# Patient Record
Sex: MALE | Race: WHITE | Employment: FULL TIME | URBAN - METROPOLITAN AREA
[De-identification: names, ages, dates, MRNs, and addresses within clinical notes are randomized per-mention and may not be internally consistent; named-entity substitution may affect disease eponyms.]

---

## 2023-10-11 ENCOUNTER — HOSPITAL ENCOUNTER (INPATIENT)
Facility: HOSPITAL | Age: 53
LOS: 6 days | Discharge: HOME | DRG: 540 | End: 2023-10-17
Attending: EMERGENCY MEDICINE | Admitting: HOSPITALIST
Payer: COMMERCIAL

## 2023-10-11 ENCOUNTER — APPOINTMENT (OUTPATIENT)
Facility: HOSPITAL | Age: 53
DRG: 540 | End: 2023-10-11
Payer: COMMERCIAL

## 2023-10-11 DIAGNOSIS — M86.171 OSTEOMYELITIS OF ANKLE OR FOOT, ACUTE, RIGHT (HCC): ICD-10-CM

## 2023-10-11 DIAGNOSIS — L03.031 CELLULITIS OF TOE OF RIGHT FOOT: Primary | ICD-10-CM

## 2023-10-11 DIAGNOSIS — R60.0 EDEMA OF RIGHT LOWER LEG: ICD-10-CM

## 2023-10-11 DIAGNOSIS — M10.9 URIC ACID ARTHROPATHY: ICD-10-CM

## 2023-10-11 DIAGNOSIS — R00.0 TACHYCARDIA: ICD-10-CM

## 2023-10-11 DIAGNOSIS — F10.10 ETOH ABUSE: ICD-10-CM

## 2023-10-11 PROBLEM — L02.611 CELLULITIS AND ABSCESS OF TOE OF RIGHT FOOT: Status: ACTIVE | Noted: 2023-10-11

## 2023-10-11 LAB
ALBUMIN SERPL-MCNC: 3.4 G/DL (ref 3.5–5)
ALBUMIN/GLOB SERPL: 0.6 (ref 1.1–2.2)
ALP SERPL-CCNC: 99 U/L (ref 45–117)
ALT SERPL-CCNC: 89 U/L (ref 12–78)
ANION GAP SERPL CALC-SCNC: 10 MMOL/L (ref 5–15)
AST SERPL-CCNC: 119 U/L (ref 15–37)
BASOPHILS # BLD: 0.1 K/UL (ref 0–0.1)
BASOPHILS NFR BLD: 1 % (ref 0–1)
BILIRUB SERPL-MCNC: 0.6 MG/DL (ref 0.2–1)
BUN SERPL-MCNC: 14 MG/DL (ref 6–20)
BUN/CREAT SERPL: 12 (ref 12–20)
CALCIUM SERPL-MCNC: 9 MG/DL (ref 8.5–10.1)
CHLORIDE SERPL-SCNC: 104 MMOL/L (ref 97–108)
CO2 SERPL-SCNC: 23 MMOL/L (ref 21–32)
CREAT SERPL-MCNC: 1.13 MG/DL (ref 0.7–1.3)
DIFFERENTIAL METHOD BLD: ABNORMAL
EOSINOPHIL # BLD: 0.3 K/UL (ref 0–0.4)
EOSINOPHIL NFR BLD: 3 % (ref 0–7)
ERYTHROCYTE [DISTWIDTH] IN BLOOD BY AUTOMATED COUNT: 13.3 % (ref 11.5–14.5)
ETHANOL SERPL-MCNC: 85 MG/DL (ref 0–0.08)
GLOBULIN SER CALC-MCNC: 5.9 G/DL (ref 2–4)
GLUCOSE SERPL-MCNC: 182 MG/DL (ref 65–100)
HCT VFR BLD AUTO: 41.7 % (ref 36.6–50.3)
HGB BLD-MCNC: 14.5 G/DL (ref 12.1–17)
IMM GRANULOCYTES # BLD AUTO: 0 K/UL (ref 0–0.04)
IMM GRANULOCYTES NFR BLD AUTO: 1 % (ref 0–0.5)
LACTATE BLD-SCNC: 2.38 MMOL/L (ref 0.4–2)
LACTATE SERPL-SCNC: 2.4 MMOL/L (ref 0.4–2)
LIPASE SERPL-CCNC: 98 U/L (ref 13–75)
LYMPHOCYTES # BLD: 1.4 K/UL (ref 0.8–3.5)
LYMPHOCYTES NFR BLD: 16 % (ref 12–49)
MAGNESIUM SERPL-MCNC: 1.9 MG/DL (ref 1.6–2.4)
MCH RBC QN AUTO: 36.6 PG (ref 26–34)
MCHC RBC AUTO-ENTMCNC: 34.8 G/DL (ref 30–36.5)
MCV RBC AUTO: 105.3 FL (ref 80–99)
MONOCYTES # BLD: 0.8 K/UL (ref 0–1)
MONOCYTES NFR BLD: 9 % (ref 5–13)
NEUTS SEG # BLD: 6.2 K/UL (ref 1.8–8)
NEUTS SEG NFR BLD: 70 % (ref 32–75)
NRBC # BLD: 0 K/UL (ref 0–0.01)
NRBC BLD-RTO: 0 PER 100 WBC
PLATELET # BLD AUTO: 164 K/UL (ref 150–400)
PMV BLD AUTO: 8.5 FL (ref 8.9–12.9)
POTASSIUM SERPL-SCNC: 3.9 MMOL/L (ref 3.5–5.1)
PROT SERPL-MCNC: 9.3 G/DL (ref 6.4–8.2)
RBC # BLD AUTO: 3.96 M/UL (ref 4.1–5.7)
SODIUM SERPL-SCNC: 137 MMOL/L (ref 136–145)
WBC # BLD AUTO: 8.8 K/UL (ref 4.1–11.1)

## 2023-10-11 PROCEDURE — 83735 ASSAY OF MAGNESIUM: CPT

## 2023-10-11 PROCEDURE — 6370000000 HC RX 637 (ALT 250 FOR IP): Performed by: HOSPITALIST

## 2023-10-11 PROCEDURE — 36415 COLL VENOUS BLD VENIPUNCTURE: CPT

## 2023-10-11 PROCEDURE — 73630 X-RAY EXAM OF FOOT: CPT

## 2023-10-11 PROCEDURE — 1100000000 HC RM PRIVATE

## 2023-10-11 PROCEDURE — 2580000003 HC RX 258: Performed by: HOSPITALIST

## 2023-10-11 PROCEDURE — 6360000002 HC RX W HCPCS: Performed by: NURSE PRACTITIONER

## 2023-10-11 PROCEDURE — 6360000002 HC RX W HCPCS: Performed by: HOSPITALIST

## 2023-10-11 PROCEDURE — 83690 ASSAY OF LIPASE: CPT

## 2023-10-11 PROCEDURE — 85025 COMPLETE CBC W/AUTO DIFF WBC: CPT

## 2023-10-11 PROCEDURE — 83605 ASSAY OF LACTIC ACID: CPT

## 2023-10-11 PROCEDURE — 87040 BLOOD CULTURE FOR BACTERIA: CPT

## 2023-10-11 PROCEDURE — 80053 COMPREHEN METABOLIC PANEL: CPT

## 2023-10-11 PROCEDURE — 2580000003 HC RX 258: Performed by: NURSE PRACTITIONER

## 2023-10-11 PROCEDURE — 99285 EMERGENCY DEPT VISIT HI MDM: CPT

## 2023-10-11 PROCEDURE — 82077 ASSAY SPEC XCP UR&BREATH IA: CPT

## 2023-10-11 RX ORDER — LORAZEPAM 2 MG/ML
4 INJECTION INTRAMUSCULAR
Status: DISCONTINUED | OUTPATIENT
Start: 2023-10-11 | End: 2023-10-17 | Stop reason: HOSPADM

## 2023-10-11 RX ORDER — LORAZEPAM 2 MG/ML
2 INJECTION INTRAMUSCULAR
Status: DISCONTINUED | OUTPATIENT
Start: 2023-10-11 | End: 2023-10-17 | Stop reason: HOSPADM

## 2023-10-11 RX ORDER — ONDANSETRON 2 MG/ML
4 INJECTION INTRAMUSCULAR; INTRAVENOUS EVERY 6 HOURS PRN
Status: DISCONTINUED | OUTPATIENT
Start: 2023-10-11 | End: 2023-10-17 | Stop reason: HOSPADM

## 2023-10-11 RX ORDER — SODIUM CHLORIDE 9 MG/ML
INJECTION, SOLUTION INTRAVENOUS PRN
Status: DISCONTINUED | OUTPATIENT
Start: 2023-10-11 | End: 2023-10-17 | Stop reason: HOSPADM

## 2023-10-11 RX ORDER — SODIUM CHLORIDE 0.9 % (FLUSH) 0.9 %
5-40 SYRINGE (ML) INJECTION PRN
Status: DISCONTINUED | OUTPATIENT
Start: 2023-10-11 | End: 2023-10-17 | Stop reason: HOSPADM

## 2023-10-11 RX ORDER — GAUZE BANDAGE 2" X 2"
100 BANDAGE TOPICAL DAILY
Status: DISCONTINUED | OUTPATIENT
Start: 2023-10-11 | End: 2023-10-17 | Stop reason: HOSPADM

## 2023-10-11 RX ORDER — ACETAMINOPHEN 325 MG/1
650 TABLET ORAL EVERY 6 HOURS PRN
Status: DISCONTINUED | OUTPATIENT
Start: 2023-10-11 | End: 2023-10-11 | Stop reason: SDUPTHER

## 2023-10-11 RX ORDER — LORAZEPAM 2 MG/ML
1 INJECTION INTRAMUSCULAR
Status: DISCONTINUED | OUTPATIENT
Start: 2023-10-11 | End: 2023-10-17 | Stop reason: HOSPADM

## 2023-10-11 RX ORDER — SODIUM CHLORIDE 9 MG/ML
INJECTION, SOLUTION INTRAVENOUS PRN
Status: DISCONTINUED | OUTPATIENT
Start: 2023-10-11 | End: 2023-10-11 | Stop reason: SDUPTHER

## 2023-10-11 RX ORDER — ACETAMINOPHEN 650 MG/1
650 SUPPOSITORY RECTAL EVERY 6 HOURS PRN
Status: DISCONTINUED | OUTPATIENT
Start: 2023-10-11 | End: 2023-10-11 | Stop reason: SDUPTHER

## 2023-10-11 RX ORDER — PHENOBARBITAL 32.4 MG/1
16.2 TABLET ORAL EVERY 6 HOURS PRN
Status: DISCONTINUED | OUTPATIENT
Start: 2023-10-13 | End: 2023-10-11

## 2023-10-11 RX ORDER — PHENOBARBITAL 32.4 MG/1
32.4 TABLET ORAL EVERY 6 HOURS PRN
Status: DISCONTINUED | OUTPATIENT
Start: 2023-10-11 | End: 2023-10-11

## 2023-10-11 RX ORDER — PHENOBARBITAL 32.4 MG/1
16.2 TABLET ORAL 2 TIMES DAILY
Status: COMPLETED | OUTPATIENT
Start: 2023-10-13 | End: 2023-10-14

## 2023-10-11 RX ORDER — LORAZEPAM 2 MG/ML
3 INJECTION INTRAMUSCULAR
Status: DISCONTINUED | OUTPATIENT
Start: 2023-10-11 | End: 2023-10-17 | Stop reason: HOSPADM

## 2023-10-11 RX ORDER — SODIUM CHLORIDE 0.9 % (FLUSH) 0.9 %
5-40 SYRINGE (ML) INJECTION EVERY 12 HOURS SCHEDULED
Status: DISCONTINUED | OUTPATIENT
Start: 2023-10-11 | End: 2023-10-11 | Stop reason: SDUPTHER

## 2023-10-11 RX ORDER — SODIUM CHLORIDE 0.9 % (FLUSH) 0.9 %
5-40 SYRINGE (ML) INJECTION EVERY 12 HOURS SCHEDULED
Status: DISCONTINUED | OUTPATIENT
Start: 2023-10-11 | End: 2023-10-17 | Stop reason: HOSPADM

## 2023-10-11 RX ORDER — BUPROPION HYDROCHLORIDE 150 MG/1
150 TABLET ORAL EVERY MORNING
COMMUNITY

## 2023-10-11 RX ORDER — ONDANSETRON 2 MG/ML
4 INJECTION INTRAMUSCULAR; INTRAVENOUS EVERY 6 HOURS PRN
Status: DISCONTINUED | OUTPATIENT
Start: 2023-10-11 | End: 2023-10-11 | Stop reason: SDUPTHER

## 2023-10-11 RX ORDER — POLYETHYLENE GLYCOL 3350 17 G/17G
17 POWDER, FOR SOLUTION ORAL DAILY PRN
Status: DISCONTINUED | OUTPATIENT
Start: 2023-10-11 | End: 2023-10-11 | Stop reason: SDUPTHER

## 2023-10-11 RX ORDER — POLYETHYLENE GLYCOL 3350 17 G/17G
17 POWDER, FOR SOLUTION ORAL DAILY PRN
Status: DISCONTINUED | OUTPATIENT
Start: 2023-10-11 | End: 2023-10-17 | Stop reason: HOSPADM

## 2023-10-11 RX ORDER — PHENOBARBITAL 32.4 MG/1
32.4 TABLET ORAL 4 TIMES DAILY
Status: COMPLETED | OUTPATIENT
Start: 2023-10-11 | End: 2023-10-12

## 2023-10-11 RX ORDER — LORAZEPAM 1 MG/1
4 TABLET ORAL
Status: DISCONTINUED | OUTPATIENT
Start: 2023-10-11 | End: 2023-10-17 | Stop reason: HOSPADM

## 2023-10-11 RX ORDER — ACETAMINOPHEN 650 MG/1
650 SUPPOSITORY RECTAL EVERY 6 HOURS PRN
Status: DISCONTINUED | OUTPATIENT
Start: 2023-10-11 | End: 2023-10-17 | Stop reason: HOSPADM

## 2023-10-11 RX ORDER — ONDANSETRON 4 MG/1
4 TABLET, ORALLY DISINTEGRATING ORAL EVERY 8 HOURS PRN
Status: DISCONTINUED | OUTPATIENT
Start: 2023-10-11 | End: 2023-10-11 | Stop reason: SDUPTHER

## 2023-10-11 RX ORDER — LORAZEPAM 1 MG/1
2 TABLET ORAL
Status: DISCONTINUED | OUTPATIENT
Start: 2023-10-11 | End: 2023-10-17 | Stop reason: HOSPADM

## 2023-10-11 RX ORDER — ACETAMINOPHEN 325 MG/1
650 TABLET ORAL EVERY 6 HOURS PRN
Status: DISCONTINUED | OUTPATIENT
Start: 2023-10-11 | End: 2023-10-17 | Stop reason: HOSPADM

## 2023-10-11 RX ORDER — LORAZEPAM 1 MG/1
3 TABLET ORAL
Status: DISCONTINUED | OUTPATIENT
Start: 2023-10-11 | End: 2023-10-17 | Stop reason: HOSPADM

## 2023-10-11 RX ORDER — PHENOBARBITAL 32.4 MG/1
32.4 TABLET ORAL 2 TIMES DAILY
Status: COMPLETED | OUTPATIENT
Start: 2023-10-12 | End: 2023-10-13

## 2023-10-11 RX ORDER — ONDANSETRON 4 MG/1
4 TABLET, ORALLY DISINTEGRATING ORAL EVERY 8 HOURS PRN
Status: DISCONTINUED | OUTPATIENT
Start: 2023-10-11 | End: 2023-10-17 | Stop reason: HOSPADM

## 2023-10-11 RX ORDER — SODIUM CHLORIDE 0.9 % (FLUSH) 0.9 %
5-40 SYRINGE (ML) INJECTION PRN
Status: DISCONTINUED | OUTPATIENT
Start: 2023-10-11 | End: 2023-10-11 | Stop reason: SDUPTHER

## 2023-10-11 RX ORDER — LORAZEPAM 1 MG/1
1 TABLET ORAL
Status: DISCONTINUED | OUTPATIENT
Start: 2023-10-11 | End: 2023-10-17 | Stop reason: HOSPADM

## 2023-10-11 RX ADMIN — Medication 2500 MG: at 18:12

## 2023-10-11 RX ADMIN — PHENOBARBITAL 32.4 MG: 32.4 TABLET ORAL at 18:16

## 2023-10-11 RX ADMIN — Medication 100 MG: at 18:17

## 2023-10-11 RX ADMIN — PHENOBARBITAL 32.4 MG: 32.4 TABLET ORAL at 22:38

## 2023-10-11 RX ADMIN — PIPERACILLIN AND TAZOBACTAM 4500 MG: 4; .5 INJECTION, POWDER, LYOPHILIZED, FOR SOLUTION INTRAVENOUS at 16:52

## 2023-10-11 RX ADMIN — PIPERACILLIN AND TAZOBACTAM 3375 MG: 3; .375 INJECTION, POWDER, LYOPHILIZED, FOR SOLUTION INTRAVENOUS at 22:45

## 2023-10-11 RX ADMIN — SODIUM CHLORIDE, PRESERVATIVE FREE 10 ML: 5 INJECTION INTRAVENOUS at 22:56

## 2023-10-11 ASSESSMENT — ENCOUNTER SYMPTOMS
COUGH: 0
COLOR CHANGE: 1
TROUBLE SWALLOWING: 0
SHORTNESS OF BREATH: 0
EYE PAIN: 0
SINUS PRESSURE: 0
ABDOMINAL DISTENTION: 0
SINUS PAIN: 0
EYE REDNESS: 0

## 2023-10-11 ASSESSMENT — PAIN - FUNCTIONAL ASSESSMENT: PAIN_FUNCTIONAL_ASSESSMENT: NONE - DENIES PAIN

## 2023-10-11 NOTE — H&P
[address] [place] [address] Jennifer Carballotorres Spann Laura  [phone]    [address] [place] [address] Drive Adult  Hospitalist Group    History & Physical    Date of service: 10/11/2023    Patient name: Mateo Kwon  MRN: [id number]  YOB: 1970  Age: 48 y.o. Primary care provider:  None, None     Source of Information: patient, medical records and ERP                                  Chief complain: Right foot wound    History of present illness  Mateo Kwon is a 48 y.o. male who presented with right foot wound. Patient is alert awake oriented x3. He is accompanied by his wife. As per the patient 2 weeks back he had a cellulitis on the right foot, there was a little abscess as well. He went to the urgent care where it was drained and patient was started on oral antibiotics. He finished his antibiotics but his swelling and redness has not gone away. He went to see a podiatrist this morning. He was sent to the ER from podiatrist office. Patient also has a history alcohol abuse and he drinks vodka every day. He does get some shaking but he denies any alcohol withdrawal related seizures. His last drink was last night. He denies having any fever or chills. No nausea vomiting diarrhea or constipation. Past medical history-gout, alcohol abuse    PSH- surgery for undescended testicle at the age of 8. Social history-former smoker, has 10 drinks every day,  and lives at home. Family Hx- Diabetes in maternal grand mother. No past medical history on file. No past surgical history on file. Prior to Admission medications    Not on File     No Known Allergies   No family history on file. Social history    Social History     Tobacco Use   Smoking Status Not on file   Smokeless Tobacco Not on file   Drinks every day.     Social History     Substance and Sexual Activity   Alcohol Use Not on file       Code status  Code status discussed with the

## 2023-10-11 NOTE — ED PROVIDER NOTES
[facility] EMERGENCY DEPT  EMERGENCY DEPARTMENT ENCOUNTER      Pt Name: Ml Reza  MRN: [id number]  [address] 1970  Date of evaluation: 10/11/2023  Provider: JOHN Stafford NP      HISTORY OF PRESENT ILLNESS      This is a 48year old male who presents ambulatory to the emergency room with c/o right foot cellulitis. Patient states this started about two weeks ago as an ache. He went to urgent care where an abscess was drained. Was placed on po antibiotics and was given a \"shot of antibiotic. \" Finished course of po antibiotics and comes today with worsening pain, swelling and erythema after a visit to the podiatrist this am. Patient is also a drinker, drinking 8 vodka and sodas in a wine glass daily. Has not been through DT's nor has he had seizures. Last night was his last drink. States he \"doesn't think he is a diabetic. \" Denies any pain at the current time. No further complaints at this time. The history is provided by the patient. Nursing Notes were reviewed. REVIEW OF SYSTEMS         Review of Systems   Constitutional:  Negative for activity change, chills, diaphoresis, fatigue and fever. HENT:  Negative for congestion, sinus pressure, sinus pain and trouble swallowing. Eyes:  Negative for pain, redness and visual disturbance. Respiratory:  Negative for cough and shortness of breath. Cardiovascular:  Negative for chest pain and palpitations. Gastrointestinal:  Negative for abdominal distention. Genitourinary:  Negative for difficulty urinating, frequency and urgency. Musculoskeletal:  Negative for arthralgias, gait problem, neck pain and neck stiffness. Skin:  Positive for color change (erythema right foot). Negative for pallor, rash and wound. Neurological:  Positive for tremors. Negative for dizziness, speech difficulty and weakness. Hematological:  Does not bruise/bleed easily. Psychiatric/Behavioral:  Negative for agitation.  The patient is not individually reviewed any labs and any images obtained. This was discussed with my attending and he/she is in agreement with plan of care. Problems Addressed:  Cellulitis of toe of right foot: acute illness or injury  ETOH abuse: acute illness or injury  Osteomyelitis of ankle or foot, acute, right ([address]): acute illness or injury  Tachycardia: acute illness or injury    Amount and/or Complexity of Data Reviewed  Labs: ordered. Decision-making details documented in ED Course. Radiology: ordered. Decision-making details documented in ED Course. Risk  Decision regarding hospitalization. REASSESSMENT          CONSULTS:  None    PROCEDURES:     Procedures    Labs Reviewed   CBC WITH AUTO DIFFERENTIAL - Abnormal; Notable for the following components:       Result Value    RBC 3.96 (*)     .3 (*)     MCH 36.6 (*)     MPV 8.5 (*)     Immature Granulocytes 1 (*)     All other components within normal limits   COMPREHENSIVE METABOLIC PANEL - Abnormal; Notable for the following components:    Glucose 182 (*)     ALT 89 (*)      (*)     Total Protein 9.3 (*)     Albumin 3.4 (*)     Globulin 5.9 (*)     Albumin/Globulin Ratio 0.6 (*)     All other components within normal limits   ETHANOL - Abnormal; Notable for the following components:    Ethanol Lvl 85 (*)     All other components within normal limits   LIPASE - Abnormal; Notable for the following components:    Lipase 98 (*)     All other components within normal limits   POC LACTIC ACID - Abnormal; Notable for the following components:    POC Lactic Acid 2.38 (*)     All other components within normal limits   CULTURE, BLOOD 1   CULTURE, BLOOD 2   MAGNESIUM   LACTIC ACID   LACTIC ACID     XR FOOT RIGHT (MIN 3 VIEWS)   Final Result   Soft tissue swelling, most pronounced at the first MTP joint. Cortical/subcortical erosion, distal first metatarsal, which may be the sequela   of osteomyelitis.         Perfect Serve Consult for Admission  3:58

## 2023-10-11 NOTE — ED TRIAGE NOTES
Pt arrives ambulatory via POV w/ c/c of R foot cellulitis. Pt has been tx w/ multiple rounds of ATB & had an I&D as well. Reports hx of heavy ETOH use.

## 2023-10-12 ENCOUNTER — APPOINTMENT (OUTPATIENT)
Facility: HOSPITAL | Age: 53
DRG: 540 | End: 2023-10-12
Payer: COMMERCIAL

## 2023-10-12 LAB
ANION GAP SERPL CALC-SCNC: 7 MMOL/L (ref 5–15)
BASOPHILS # BLD: 0.1 K/UL (ref 0–0.1)
BASOPHILS NFR BLD: 1 % (ref 0–1)
BUN SERPL-MCNC: 16 MG/DL (ref 6–20)
BUN/CREAT SERPL: 16 (ref 12–20)
CALCIUM SERPL-MCNC: 8.6 MG/DL (ref 8.5–10.1)
CHLORIDE SERPL-SCNC: 106 MMOL/L (ref 97–108)
CO2 SERPL-SCNC: 21 MMOL/L (ref 21–32)
CREAT SERPL-MCNC: 1.02 MG/DL (ref 0.7–1.3)
DIFFERENTIAL METHOD BLD: ABNORMAL
EOSINOPHIL # BLD: 0.2 K/UL (ref 0–0.4)
EOSINOPHIL NFR BLD: 3 % (ref 0–7)
ERYTHROCYTE [DISTWIDTH] IN BLOOD BY AUTOMATED COUNT: 13.3 % (ref 11.5–14.5)
GLUCOSE SERPL-MCNC: 107 MG/DL (ref 65–100)
HCT VFR BLD AUTO: 37.1 % (ref 36.6–50.3)
HGB BLD-MCNC: 12.6 G/DL (ref 12.1–17)
IMM GRANULOCYTES # BLD AUTO: 0 K/UL (ref 0–0.04)
IMM GRANULOCYTES NFR BLD AUTO: 0 % (ref 0–0.5)
LYMPHOCYTES # BLD: 1.1 K/UL (ref 0.8–3.5)
LYMPHOCYTES NFR BLD: 14 % (ref 12–49)
MCH RBC QN AUTO: 36.1 PG (ref 26–34)
MCHC RBC AUTO-ENTMCNC: 34 G/DL (ref 30–36.5)
MCV RBC AUTO: 106.3 FL (ref 80–99)
MONOCYTES # BLD: 0.9 K/UL (ref 0–1)
MONOCYTES NFR BLD: 12 % (ref 5–13)
NEUTS SEG # BLD: 5.1 K/UL (ref 1.8–8)
NEUTS SEG NFR BLD: 70 % (ref 32–75)
NRBC # BLD: 0 K/UL (ref 0–0.01)
NRBC BLD-RTO: 0 PER 100 WBC
PLATELET # BLD AUTO: 109 K/UL (ref 150–400)
PMV BLD AUTO: 8.7 FL (ref 8.9–12.9)
POTASSIUM SERPL-SCNC: 3.6 MMOL/L (ref 3.5–5.1)
RBC # BLD AUTO: 3.49 M/UL (ref 4.1–5.7)
SODIUM SERPL-SCNC: 134 MMOL/L (ref 136–145)
URATE SERPL-MCNC: 7.1 MG/DL (ref 3.5–7.2)
WBC # BLD AUTO: 7.4 K/UL (ref 4.1–11.1)

## 2023-10-12 PROCEDURE — 6360000002 HC RX W HCPCS: Performed by: HOSPITALIST

## 2023-10-12 PROCEDURE — 84550 ASSAY OF BLOOD/URIC ACID: CPT

## 2023-10-12 PROCEDURE — 6360000004 HC RX CONTRAST MEDICATION: Performed by: RADIOLOGY

## 2023-10-12 PROCEDURE — 6370000000 HC RX 637 (ALT 250 FOR IP): Performed by: INTERNAL MEDICINE

## 2023-10-12 PROCEDURE — 73720 MRI LWR EXTREMITY W/O&W/DYE: CPT

## 2023-10-12 PROCEDURE — 1100000000 HC RM PRIVATE

## 2023-10-12 PROCEDURE — 6370000000 HC RX 637 (ALT 250 FOR IP): Performed by: HOSPITALIST

## 2023-10-12 PROCEDURE — 94761 N-INVAS EAR/PLS OXIMETRY MLT: CPT

## 2023-10-12 PROCEDURE — 2580000003 HC RX 258: Performed by: HOSPITALIST

## 2023-10-12 PROCEDURE — 6360000002 HC RX W HCPCS: Performed by: INTERNAL MEDICINE

## 2023-10-12 PROCEDURE — 36415 COLL VENOUS BLD VENIPUNCTURE: CPT

## 2023-10-12 PROCEDURE — 99222 1ST HOSP IP/OBS MODERATE 55: CPT | Performed by: PODIATRIST

## 2023-10-12 PROCEDURE — 80048 BASIC METABOLIC PNL TOTAL CA: CPT

## 2023-10-12 PROCEDURE — A9579 GAD-BASE MR CONTRAST NOS,1ML: HCPCS | Performed by: RADIOLOGY

## 2023-10-12 PROCEDURE — 2580000003 HC RX 258: Performed by: INTERNAL MEDICINE

## 2023-10-12 PROCEDURE — 85025 COMPLETE CBC W/AUTO DIFF WBC: CPT

## 2023-10-12 RX ORDER — FAMOTIDINE 20 MG/1
20 TABLET, FILM COATED ORAL 2 TIMES DAILY
Status: DISCONTINUED | OUTPATIENT
Start: 2023-10-12 | End: 2023-10-17 | Stop reason: HOSPADM

## 2023-10-12 RX ORDER — KETOROLAC TROMETHAMINE 15 MG/ML
15 INJECTION, SOLUTION INTRAMUSCULAR; INTRAVENOUS EVERY 6 HOURS PRN
Status: DISCONTINUED | OUTPATIENT
Start: 2023-10-12 | End: 2023-10-12

## 2023-10-12 RX ORDER — IBUPROFEN 400 MG/1
600 TABLET ORAL EVERY 6 HOURS PRN
Status: DISCONTINUED | OUTPATIENT
Start: 2023-10-12 | End: 2023-10-17 | Stop reason: HOSPADM

## 2023-10-12 RX ADMIN — FAMOTIDINE 20 MG: 20 TABLET ORAL at 19:54

## 2023-10-12 RX ADMIN — PIPERACILLIN AND TAZOBACTAM 3375 MG: 3; .375 INJECTION, POWDER, LYOPHILIZED, FOR SOLUTION INTRAVENOUS at 15:39

## 2023-10-12 RX ADMIN — FAMOTIDINE 20 MG: 20 TABLET ORAL at 13:55

## 2023-10-12 RX ADMIN — PHENOBARBITAL 32.4 MG: 32.4 TABLET ORAL at 19:57

## 2023-10-12 RX ADMIN — IBUPROFEN 600 MG: 400 TABLET, FILM COATED ORAL at 13:55

## 2023-10-12 RX ADMIN — PHENOBARBITAL 32.4 MG: 32.4 TABLET ORAL at 09:37

## 2023-10-12 RX ADMIN — Medication 100 MG: at 09:37

## 2023-10-12 RX ADMIN — LORAZEPAM 2 MG: 2 INJECTION INTRAMUSCULAR; INTRAVENOUS at 19:55

## 2023-10-12 RX ADMIN — SODIUM CHLORIDE, PRESERVATIVE FREE 10 ML: 5 INJECTION INTRAVENOUS at 09:38

## 2023-10-12 RX ADMIN — GADOTERIDOL 19 ML: 279.3 INJECTION, SOLUTION INTRAVENOUS at 20:30

## 2023-10-12 RX ADMIN — PIPERACILLIN AND TAZOBACTAM 3375 MG: 3; .375 INJECTION, POWDER, LYOPHILIZED, FOR SOLUTION INTRAVENOUS at 06:34

## 2023-10-12 RX ADMIN — PHENOBARBITAL 32.4 MG: 32.4 TABLET ORAL at 12:55

## 2023-10-12 RX ADMIN — SODIUM CHLORIDE, PRESERVATIVE FREE 10 ML: 5 INJECTION INTRAVENOUS at 19:57

## 2023-10-12 RX ADMIN — VANCOMYCIN HYDROCHLORIDE 750 MG: 750 INJECTION, POWDER, LYOPHILIZED, FOR SOLUTION INTRAVENOUS at 15:48

## 2023-10-12 RX ADMIN — VANCOMYCIN HYDROCHLORIDE 1000 MG: 1 INJECTION, POWDER, LYOPHILIZED, FOR SOLUTION INTRAVENOUS at 05:30

## 2023-10-12 ASSESSMENT — PAIN DESCRIPTION - DESCRIPTORS: DESCRIPTORS: ACHING;SORE

## 2023-10-12 ASSESSMENT — PAIN DESCRIPTION - ORIENTATION: ORIENTATION: LEFT

## 2023-10-12 ASSESSMENT — PAIN DESCRIPTION - LOCATION: LOCATION: KNEE

## 2023-10-12 ASSESSMENT — PAIN SCALES - GENERAL
PAINLEVEL_OUTOF10: 0
PAINLEVEL_OUTOF10: 2
PAINLEVEL_OUTOF10: 0

## 2023-10-12 NOTE — PROGRESS NOTES
Oral, Q6H PRN **OR** acetaminophen (TYLENOL) suppository 650 mg, 650 mg, Rectal, Q6H PRN  thiamine mononitrate tablet 100 mg, 100 mg, Oral, Daily  LORazepam (ATIVAN) tablet 1 mg, 1 mg, Oral, Q1H PRN **OR** LORazepam (ATIVAN) injection 1 mg, 1 mg, IntraVENous, Q1H PRN **OR** LORazepam (ATIVAN) tablet 2 mg, 2 mg, Oral, Q1H PRN **OR** LORazepam (ATIVAN) injection 2 mg, 2 mg, IntraVENous, Q1H PRN **OR** LORazepam (ATIVAN) tablet 3 mg, 3 mg, Oral, Q1H PRN **OR** LORazepam (ATIVAN) injection 3 mg, 3 mg, IntraVENous, Q1H PRN **OR** LORazepam (ATIVAN) tablet 4 mg, 4 mg, Oral, Q1H PRN **OR** LORazepam (ATIVAN) injection 4 mg, 4 mg, IntraVENous, Q1H PRN  [COMPLETED] PHENobarbital (LUMINAL) tablet 32.4 mg, 32.4 mg, Oral, 4x daily **FOLLOWED BY** PHENobarbital (LUMINAL) tablet 32.4 mg, 32.4 mg, Oral, BID **FOLLOWED BY** [START ON 10/13/2023] PHENobarbital (LUMINAL) tablet 16.2 mg, 16.2 mg, Oral, BID  piperacillin-tazobactam (ZOSYN) 3,375 mg in sodium chloride 0.9 % 50 mL IVPB (mini-bag), 3,375 mg, IntraVENous, q8h       ASSESSMENT and PLAN:     Principal Problem:    Cellulitis and abscess of toe of right foot  Resolved Problems:    * No resolved hospital problems. *      Right foot cellulitis, refractory to Keflex  Recent right foot abscess drained  -Consult podiatry  -Proceed with MRI as able  -Continue vancomycin and Zosyn empirically  -Follow blood cultures  -Treat pain with Tylenol and ibuprofen.     Alcohol dependence/withdrawal  -Continue CIWA protocol with phenobarbital taper  -Ativan as needed    Lactic acidosis  -No other sign of sepsis  -Repeat level not collected, will add to morning labs    Gout  -No acute flare  -Not on suppressive therapy at home, uses indomethacin as needed    DVT ppx:  scds, lovenox if no procedures required  GI prophylaxis: While on NSAIDs, will add Pepcid twice daily    Dispo:  home      CODE STATUS:  FULL CODE       Total time spent with patient care: 40 min, critical care time (excluding

## 2023-10-12 NOTE — PROGRESS NOTES
MRI attempted, Performed multiple repeats with air cushions, tie down straps with patients permission and elevated the patients knee. Patient still unable to hold still.  JCL 10/11/23 8:57PM

## 2023-10-12 NOTE — PROGRESS NOTES
Report given to [facility], rn. Lactic Acid of 2.4 reported to Dr. Cassidy Vincent. Patient in MRI at the moment.

## 2023-10-12 NOTE — CARE COORDINATION
10/12/2023  4:05 PM  Care Management Progress Note      ICD-10-CM    1. Cellulitis of toe of right foot  L03.031       2. Tachycardia  R00.0       3. ETOH abuse  F10.10       4. Osteomyelitis of ankle or foot, acute, right (HCC)  M86.171           RUR:  Calc  Risk Level: [x]Low []Moderate []High    Transition of care plan:  Awaiting medical clearance and DC order. Podiatry following. Cultures pending, and ID consulted. TBD - CM spoke with pt's wife via p/c for assessment and dispo planning. Pt's wife (a nurse) reported that she is aware IV abx may be required. Pt works remotely from home, and not likely to be considered home bound. Pt's wife reported pt would be able to go to [facility] office weekly for PICC line care and labs if IV abx are required. Outpatient follow-up. Pt's family to transport. 10/12/23 1603   Service Assessment   Patient Orientation Alert and Oriented   Cognition Alert   History Provided By Significant Other   Primary Caregiver Self   Support Systems Spouse/Significant Other   Patient's Healthcare Decision Maker is: Legal Next of Kin   PCP Verified by CM Yes  Natalia Belgica - seen in June 2023.)   Last Visit to PCP Within last 6 months   Prior Functional Level Independent in ADLs/IADLs   Current Functional Level Independent in ADLs/IADLs   Can patient return to prior living arrangement Yes   Ability to make needs known: Good   Family able to assist with home care needs: Yes   Would you like for me to discuss the discharge plan with any other family members/significant others, and if so, who?  Yes   Financial Resources Other (Comment)   Community Resources None   Social/Functional History   Lives With Spouse   Type of [address] [place] [address]  One level   Home Equipment None   Receives Help From Family   ADL Assistance Independent   Homemaking Assistance Independent   Ambulation Assistance Independent   Transfer Assistance Independent   Active  Yes   Mode of Transportation

## 2023-10-13 PROBLEM — M79.671 PAIN OF RIGHT FOOT: Status: ACTIVE | Noted: 2023-10-13

## 2023-10-13 PROBLEM — M00.9 SEPTIC ARTHRITIS OF RIGHT FOOT (HCC): Status: ACTIVE | Noted: 2023-10-13

## 2023-10-13 PROBLEM — M86.171 ACUTE OSTEOMYELITIS OF METATARSAL BONE OF RIGHT FOOT (HCC): Status: ACTIVE | Noted: 2023-10-13

## 2023-10-13 PROBLEM — F10.20 ALCOHOL DEPENDENCE (HCC): Status: ACTIVE | Noted: 2023-10-13

## 2023-10-13 LAB
CREAT SERPL-MCNC: 1.15 MG/DL (ref 0.7–1.3)
VANCOMYCIN SERPL-MCNC: 13.1 UG/ML

## 2023-10-13 PROCEDURE — 6370000000 HC RX 637 (ALT 250 FOR IP): Performed by: HOSPITALIST

## 2023-10-13 PROCEDURE — 6370000000 HC RX 637 (ALT 250 FOR IP): Performed by: INTERNAL MEDICINE

## 2023-10-13 PROCEDURE — 36415 COLL VENOUS BLD VENIPUNCTURE: CPT

## 2023-10-13 PROCEDURE — 2580000003 HC RX 258: Performed by: INTERNAL MEDICINE

## 2023-10-13 PROCEDURE — 99232 SBSQ HOSP IP/OBS MODERATE 35: CPT | Performed by: PODIATRIST

## 2023-10-13 PROCEDURE — 2580000003 HC RX 258: Performed by: HOSPITALIST

## 2023-10-13 PROCEDURE — 6360000002 HC RX W HCPCS: Performed by: HOSPITALIST

## 2023-10-13 PROCEDURE — 6360000002 HC RX W HCPCS: Performed by: INTERNAL MEDICINE

## 2023-10-13 PROCEDURE — 80202 ASSAY OF VANCOMYCIN: CPT

## 2023-10-13 PROCEDURE — 82565 ASSAY OF CREATININE: CPT

## 2023-10-13 PROCEDURE — 1100000000 HC RM PRIVATE

## 2023-10-13 RX ORDER — MORPHINE SULFATE 2 MG/ML
2 INJECTION, SOLUTION INTRAMUSCULAR; INTRAVENOUS
Status: DISCONTINUED | OUTPATIENT
Start: 2023-10-13 | End: 2023-10-17 | Stop reason: HOSPADM

## 2023-10-13 RX ADMIN — PIPERACILLIN AND TAZOBACTAM 3375 MG: 3; .375 INJECTION, POWDER, LYOPHILIZED, FOR SOLUTION INTRAVENOUS at 14:23

## 2023-10-13 RX ADMIN — IBUPROFEN 600 MG: 400 TABLET, FILM COATED ORAL at 16:50

## 2023-10-13 RX ADMIN — VANCOMYCIN HYDROCHLORIDE 750 MG: 750 INJECTION, POWDER, LYOPHILIZED, FOR SOLUTION INTRAVENOUS at 06:20

## 2023-10-13 RX ADMIN — VANCOMYCIN HYDROCHLORIDE 750 MG: 750 INJECTION, POWDER, LYOPHILIZED, FOR SOLUTION INTRAVENOUS at 23:04

## 2023-10-13 RX ADMIN — PIPERACILLIN AND TAZOBACTAM 3375 MG: 3; .375 INJECTION, POWDER, LYOPHILIZED, FOR SOLUTION INTRAVENOUS at 00:51

## 2023-10-13 RX ADMIN — Medication 100 MG: at 07:55

## 2023-10-13 RX ADMIN — PIPERACILLIN AND TAZOBACTAM 3375 MG: 3; .375 INJECTION, POWDER, LYOPHILIZED, FOR SOLUTION INTRAVENOUS at 23:05

## 2023-10-13 RX ADMIN — VANCOMYCIN HYDROCHLORIDE 750 MG: 750 INJECTION, POWDER, LYOPHILIZED, FOR SOLUTION INTRAVENOUS at 14:24

## 2023-10-13 RX ADMIN — PHENOBARBITAL 32.4 MG: 32.4 TABLET ORAL at 07:55

## 2023-10-13 RX ADMIN — ACETAMINOPHEN 650 MG: 325 TABLET ORAL at 18:14

## 2023-10-13 RX ADMIN — SODIUM CHLORIDE, PRESERVATIVE FREE 10 ML: 5 INJECTION INTRAVENOUS at 07:55

## 2023-10-13 RX ADMIN — PIPERACILLIN AND TAZOBACTAM 3375 MG: 3; .375 INJECTION, POWDER, LYOPHILIZED, FOR SOLUTION INTRAVENOUS at 06:19

## 2023-10-13 RX ADMIN — IBUPROFEN 600 MG: 400 TABLET, FILM COATED ORAL at 07:55

## 2023-10-13 RX ADMIN — PHENOBARBITAL 16.2 MG: 32.4 TABLET ORAL at 20:47

## 2023-10-13 RX ADMIN — VANCOMYCIN HYDROCHLORIDE 750 MG: 750 INJECTION, POWDER, LYOPHILIZED, FOR SOLUTION INTRAVENOUS at 00:48

## 2023-10-13 RX ADMIN — ACETAMINOPHEN 650 MG: 325 TABLET ORAL at 07:55

## 2023-10-13 RX ADMIN — FAMOTIDINE 20 MG: 20 TABLET ORAL at 07:55

## 2023-10-13 ASSESSMENT — PAIN DESCRIPTION - DESCRIPTORS
DESCRIPTORS: ACHING;TIGHTNESS;OTHER (COMMENT)
DESCRIPTORS: ACHING;SORE
DESCRIPTORS: ACHING;SORE

## 2023-10-13 ASSESSMENT — PAIN DESCRIPTION - LOCATION
LOCATION: LEG
LOCATION: OTHER (COMMENT)
LOCATION: HIP;KNEE

## 2023-10-13 ASSESSMENT — PAIN SCALES - GENERAL
PAINLEVEL_OUTOF10: 2
PAINLEVEL_OUTOF10: 2
PAINLEVEL_OUTOF10: 5
PAINLEVEL_OUTOF10: 2
PAINLEVEL_OUTOF10: 3

## 2023-10-13 ASSESSMENT — ENCOUNTER SYMPTOMS
SHORTNESS OF BREATH: 0
VOMITING: 0
ABDOMINAL PAIN: 0
DIARRHEA: 0

## 2023-10-13 ASSESSMENT — PAIN DESCRIPTION - ORIENTATION
ORIENTATION: LEFT;RIGHT
ORIENTATION: LEFT;RIGHT

## 2023-10-13 NOTE — PROGRESS NOTES
Spiritual Care Partner Volunteer visited patient at [address] [place] [address] in [facility] 4M POST SURG ORT 1 on 10/13/2023   Documented by:  Félix Casanova  Page Spiritual Care to [phone]

## 2023-10-14 ENCOUNTER — APPOINTMENT (OUTPATIENT)
Dept: VASCULAR SURGERY | Facility: HOSPITAL | Age: 53
DRG: 540 | End: 2023-10-14
Attending: INTERNAL MEDICINE
Payer: COMMERCIAL

## 2023-10-14 LAB
ANION GAP SERPL CALC-SCNC: 7 MMOL/L (ref 5–15)
BUN SERPL-MCNC: 14 MG/DL (ref 6–20)
BUN/CREAT SERPL: 12 (ref 12–20)
CALCIUM SERPL-MCNC: 8.1 MG/DL (ref 8.5–10.1)
CHLORIDE SERPL-SCNC: 107 MMOL/L (ref 97–108)
CO2 SERPL-SCNC: 25 MMOL/L (ref 21–32)
CREAT SERPL-MCNC: 1.17 MG/DL (ref 0.7–1.3)
ERYTHROCYTE [DISTWIDTH] IN BLOOD BY AUTOMATED COUNT: 13.3 % (ref 11.5–14.5)
GLUCOSE SERPL-MCNC: 100 MG/DL (ref 65–100)
HCT VFR BLD AUTO: 35.5 % (ref 36.6–50.3)
HGB BLD-MCNC: 11.7 G/DL (ref 12.1–17)
MAGNESIUM SERPL-MCNC: 1.7 MG/DL (ref 1.6–2.4)
MCH RBC QN AUTO: 36 PG (ref 26–34)
MCHC RBC AUTO-ENTMCNC: 33 G/DL (ref 30–36.5)
MCV RBC AUTO: 109.2 FL (ref 80–99)
NRBC # BLD: 0 K/UL (ref 0–0.01)
NRBC BLD-RTO: 0 PER 100 WBC
PLATELET # BLD AUTO: 163 K/UL (ref 150–400)
PMV BLD AUTO: 8.9 FL (ref 8.9–12.9)
POTASSIUM SERPL-SCNC: 3.5 MMOL/L (ref 3.5–5.1)
RBC # BLD AUTO: 3.25 M/UL (ref 4.1–5.7)
SODIUM SERPL-SCNC: 139 MMOL/L (ref 136–145)
WBC # BLD AUTO: 8.2 K/UL (ref 4.1–11.1)

## 2023-10-14 PROCEDURE — 6370000000 HC RX 637 (ALT 250 FOR IP): Performed by: INTERNAL MEDICINE

## 2023-10-14 PROCEDURE — 1100000000 HC RM PRIVATE

## 2023-10-14 PROCEDURE — 93971 EXTREMITY STUDY: CPT

## 2023-10-14 PROCEDURE — 85027 COMPLETE CBC AUTOMATED: CPT

## 2023-10-14 PROCEDURE — 6370000000 HC RX 637 (ALT 250 FOR IP): Performed by: HOSPITALIST

## 2023-10-14 PROCEDURE — 36415 COLL VENOUS BLD VENIPUNCTURE: CPT

## 2023-10-14 PROCEDURE — 83735 ASSAY OF MAGNESIUM: CPT

## 2023-10-14 PROCEDURE — 94761 N-INVAS EAR/PLS OXIMETRY MLT: CPT

## 2023-10-14 PROCEDURE — 2580000003 HC RX 258: Performed by: HOSPITALIST

## 2023-10-14 PROCEDURE — 80048 BASIC METABOLIC PNL TOTAL CA: CPT

## 2023-10-14 PROCEDURE — 2580000003 HC RX 258: Performed by: INTERNAL MEDICINE

## 2023-10-14 PROCEDURE — 6360000002 HC RX W HCPCS: Performed by: HOSPITALIST

## 2023-10-14 PROCEDURE — 6360000002 HC RX W HCPCS: Performed by: INTERNAL MEDICINE

## 2023-10-14 RX ORDER — TESTOSTERONE 20.25 MG/1.25G
1 GEL TOPICAL DAILY
COMMUNITY

## 2023-10-14 RX ORDER — BUPROPION HYDROCHLORIDE 150 MG/1
150 TABLET ORAL EVERY MORNING
Status: DISCONTINUED | OUTPATIENT
Start: 2023-10-14 | End: 2023-10-17 | Stop reason: HOSPADM

## 2023-10-14 RX ORDER — TESTOSTERONE 20.25 MG/1.25G
1 GEL TOPICAL DAILY
Status: DISCONTINUED | OUTPATIENT
Start: 2023-10-14 | End: 2023-10-15

## 2023-10-14 RX ADMIN — IBUPROFEN 600 MG: 400 TABLET, FILM COATED ORAL at 21:30

## 2023-10-14 RX ADMIN — PIPERACILLIN AND TAZOBACTAM 3375 MG: 3; .375 INJECTION, POWDER, LYOPHILIZED, FOR SOLUTION INTRAVENOUS at 15:30

## 2023-10-14 RX ADMIN — FAMOTIDINE 20 MG: 20 TABLET ORAL at 09:20

## 2023-10-14 RX ADMIN — BUPROPION HYDROCHLORIDE 150 MG: 150 TABLET, EXTENDED RELEASE ORAL at 12:17

## 2023-10-14 RX ADMIN — ACETAMINOPHEN 650 MG: 325 TABLET ORAL at 03:33

## 2023-10-14 RX ADMIN — ACETAMINOPHEN 650 MG: 325 TABLET ORAL at 09:14

## 2023-10-14 RX ADMIN — Medication 100 MG: at 09:15

## 2023-10-14 RX ADMIN — PIPERACILLIN AND TAZOBACTAM 3375 MG: 3; .375 INJECTION, POWDER, LYOPHILIZED, FOR SOLUTION INTRAVENOUS at 22:52

## 2023-10-14 RX ADMIN — PHENOBARBITAL 16.2 MG: 32.4 TABLET ORAL at 09:15

## 2023-10-14 RX ADMIN — PIPERACILLIN AND TAZOBACTAM 3375 MG: 3; .375 INJECTION, POWDER, LYOPHILIZED, FOR SOLUTION INTRAVENOUS at 06:38

## 2023-10-14 RX ADMIN — SODIUM CHLORIDE, PRESERVATIVE FREE 10 ML: 5 INJECTION INTRAVENOUS at 20:23

## 2023-10-14 RX ADMIN — IBUPROFEN 600 MG: 400 TABLET, FILM COATED ORAL at 06:47

## 2023-10-14 RX ADMIN — VANCOMYCIN HYDROCHLORIDE 750 MG: 750 INJECTION, POWDER, LYOPHILIZED, FOR SOLUTION INTRAVENOUS at 22:52

## 2023-10-14 RX ADMIN — VANCOMYCIN HYDROCHLORIDE 750 MG: 750 INJECTION, POWDER, LYOPHILIZED, FOR SOLUTION INTRAVENOUS at 15:30

## 2023-10-14 RX ADMIN — VANCOMYCIN HYDROCHLORIDE 750 MG: 750 INJECTION, POWDER, LYOPHILIZED, FOR SOLUTION INTRAVENOUS at 06:38

## 2023-10-14 RX ADMIN — IBUPROFEN 600 MG: 400 TABLET, FILM COATED ORAL at 00:44

## 2023-10-14 RX ADMIN — ACETAMINOPHEN 650 MG: 325 TABLET ORAL at 18:41

## 2023-10-14 RX ADMIN — IBUPROFEN 600 MG: 400 TABLET, FILM COATED ORAL at 15:28

## 2023-10-14 ASSESSMENT — PAIN - FUNCTIONAL ASSESSMENT: PAIN_FUNCTIONAL_ASSESSMENT: PREVENTS OR INTERFERES SOME ACTIVE ACTIVITIES AND ADLS

## 2023-10-14 ASSESSMENT — PAIN DESCRIPTION - LOCATION
LOCATION: LEG
LOCATION: KNEE
LOCATION: LEG;HEAD
LOCATION: LEG
LOCATION: LEG;KNEE
LOCATION: FOOT
LOCATION: FOOT

## 2023-10-14 ASSESSMENT — PAIN DESCRIPTION - ORIENTATION
ORIENTATION: LEFT;RIGHT;LOWER
ORIENTATION: RIGHT;LOWER
ORIENTATION: RIGHT

## 2023-10-14 ASSESSMENT — PAIN DESCRIPTION - PAIN TYPE: TYPE: ACUTE PAIN

## 2023-10-14 ASSESSMENT — PAIN SCALES - GENERAL
PAINLEVEL_OUTOF10: 1
PAINLEVEL_OUTOF10: 2
PAINLEVEL_OUTOF10: 3
PAINLEVEL_OUTOF10: 6
PAINLEVEL_OUTOF10: 3
PAINLEVEL_OUTOF10: 3
PAINLEVEL_OUTOF10: 2
PAINLEVEL_OUTOF10: 1
PAINLEVEL_OUTOF10: 2

## 2023-10-14 ASSESSMENT — PAIN DESCRIPTION - DESCRIPTORS
DESCRIPTORS: NAGGING;ACHING
DESCRIPTORS: ACHING;TIGHTNESS
DESCRIPTORS: ACHING;TIGHTNESS
DESCRIPTORS: ACHING

## 2023-10-15 ENCOUNTER — APPOINTMENT (OUTPATIENT)
Facility: HOSPITAL | Age: 53
DRG: 540 | End: 2023-10-15
Payer: COMMERCIAL

## 2023-10-15 PROCEDURE — 73560 X-RAY EXAM OF KNEE 1 OR 2: CPT

## 2023-10-15 PROCEDURE — 6370000000 HC RX 637 (ALT 250 FOR IP): Performed by: INTERNAL MEDICINE

## 2023-10-15 PROCEDURE — 1100000000 HC RM PRIVATE

## 2023-10-15 PROCEDURE — 6370000000 HC RX 637 (ALT 250 FOR IP): Performed by: HOSPITALIST

## 2023-10-15 PROCEDURE — 2580000003 HC RX 258: Performed by: HOSPITALIST

## 2023-10-15 PROCEDURE — 97165 OT EVAL LOW COMPLEX 30 MIN: CPT

## 2023-10-15 PROCEDURE — 97162 PT EVAL MOD COMPLEX 30 MIN: CPT

## 2023-10-15 PROCEDURE — 2580000003 HC RX 258: Performed by: INTERNAL MEDICINE

## 2023-10-15 PROCEDURE — 6360000002 HC RX W HCPCS: Performed by: INTERNAL MEDICINE

## 2023-10-15 PROCEDURE — 97535 SELF CARE MNGMENT TRAINING: CPT

## 2023-10-15 PROCEDURE — 97116 GAIT TRAINING THERAPY: CPT

## 2023-10-15 PROCEDURE — 6360000002 HC RX W HCPCS: Performed by: HOSPITALIST

## 2023-10-15 RX ADMIN — IBUPROFEN 600 MG: 400 TABLET, FILM COATED ORAL at 08:14

## 2023-10-15 RX ADMIN — VANCOMYCIN HYDROCHLORIDE 750 MG: 750 INJECTION, POWDER, LYOPHILIZED, FOR SOLUTION INTRAVENOUS at 06:24

## 2023-10-15 RX ADMIN — FAMOTIDINE 20 MG: 20 TABLET ORAL at 08:15

## 2023-10-15 RX ADMIN — PIPERACILLIN AND TAZOBACTAM 3375 MG: 3; .375 INJECTION, POWDER, LYOPHILIZED, FOR SOLUTION INTRAVENOUS at 15:14

## 2023-10-15 RX ADMIN — ACETAMINOPHEN 650 MG: 325 TABLET ORAL at 06:22

## 2023-10-15 RX ADMIN — SODIUM CHLORIDE, PRESERVATIVE FREE 10 ML: 5 INJECTION INTRAVENOUS at 21:24

## 2023-10-15 RX ADMIN — PIPERACILLIN AND TAZOBACTAM 3375 MG: 3; .375 INJECTION, POWDER, LYOPHILIZED, FOR SOLUTION INTRAVENOUS at 23:46

## 2023-10-15 RX ADMIN — SODIUM CHLORIDE, PRESERVATIVE FREE 10 ML: 5 INJECTION INTRAVENOUS at 08:16

## 2023-10-15 RX ADMIN — BUPROPION HYDROCHLORIDE 150 MG: 150 TABLET, EXTENDED RELEASE ORAL at 08:15

## 2023-10-15 RX ADMIN — DICLOFENAC SODIUM 2 G: 10 GEL TOPICAL at 12:08

## 2023-10-15 RX ADMIN — IBUPROFEN 600 MG: 400 TABLET, FILM COATED ORAL at 03:13

## 2023-10-15 RX ADMIN — VANCOMYCIN HYDROCHLORIDE 750 MG: 750 INJECTION, POWDER, LYOPHILIZED, FOR SOLUTION INTRAVENOUS at 23:46

## 2023-10-15 RX ADMIN — PIPERACILLIN AND TAZOBACTAM 3375 MG: 3; .375 INJECTION, POWDER, LYOPHILIZED, FOR SOLUTION INTRAVENOUS at 06:24

## 2023-10-15 RX ADMIN — Medication 100 MG: at 08:15

## 2023-10-15 RX ADMIN — ACETAMINOPHEN 650 MG: 325 TABLET ORAL at 12:08

## 2023-10-15 RX ADMIN — ACETAMINOPHEN 650 MG: 325 TABLET ORAL at 18:03

## 2023-10-15 RX ADMIN — IBUPROFEN 600 MG: 400 TABLET, FILM COATED ORAL at 15:12

## 2023-10-15 RX ADMIN — DICLOFENAC SODIUM 2 G: 10 GEL TOPICAL at 21:23

## 2023-10-15 RX ADMIN — IBUPROFEN 600 MG: 400 TABLET, FILM COATED ORAL at 21:22

## 2023-10-15 RX ADMIN — VANCOMYCIN HYDROCHLORIDE 750 MG: 750 INJECTION, POWDER, LYOPHILIZED, FOR SOLUTION INTRAVENOUS at 15:16

## 2023-10-15 ASSESSMENT — PAIN SCALES - GENERAL
PAINLEVEL_OUTOF10: 2
PAINLEVEL_OUTOF10: 3
PAINLEVEL_OUTOF10: 5
PAINLEVEL_OUTOF10: 2
PAINLEVEL_OUTOF10: 3
PAINLEVEL_OUTOF10: 3

## 2023-10-15 ASSESSMENT — PAIN DESCRIPTION - LOCATION
LOCATION: LEG;KNEE
LOCATION: KNEE
LOCATION: HIP
LOCATION: HIP;LEG;KNEE
LOCATION: LEG;KNEE
LOCATION: KNEE
LOCATION: KNEE;LEG;HIP

## 2023-10-15 ASSESSMENT — PAIN DESCRIPTION - ORIENTATION
ORIENTATION: RIGHT
ORIENTATION: LEFT;RIGHT;LOWER
ORIENTATION: LEFT;RIGHT;LOWER
ORIENTATION: RIGHT;LEFT
ORIENTATION: LEFT;RIGHT;LOWER
ORIENTATION: LEFT;RIGHT;LOWER
ORIENTATION: RIGHT

## 2023-10-15 ASSESSMENT — PAIN - FUNCTIONAL ASSESSMENT: PAIN_FUNCTIONAL_ASSESSMENT: PREVENTS OR INTERFERES SOME ACTIVE ACTIVITIES AND ADLS

## 2023-10-15 ASSESSMENT — PAIN DESCRIPTION - DESCRIPTORS
DESCRIPTORS: ACHING;TIGHTNESS
DESCRIPTORS: ACHING
DESCRIPTORS: ACHING;TIGHTNESS
DESCRIPTORS: ACHING
DESCRIPTORS: TIGHTNESS

## 2023-10-15 ASSESSMENT — PAIN DESCRIPTION - PAIN TYPE: TYPE: ACUTE PAIN

## 2023-10-15 NOTE — PLAN OF CARE
Problem: Discharge Planning  Goal: Discharge to home or other facility with appropriate resources  10/15/2023 1112 by Christian Heath LPN  Outcome: Progressing  10/15/2023 0117 by Abbi Colin RN  Outcome: Progressing     Problem: Safety - Adult  Goal: Free from fall injury  10/15/2023 1112 by Christian Heath LPN  Outcome: Progressing  10/15/2023 0117 by Abbi Colin RN  Outcome: Progressing     Problem: Pain  Goal: Verbalizes/displays adequate comfort level or baseline comfort level  10/15/2023 1112 by Christian Heath LPN  Outcome: Progressing  10/15/2023 0117 by Abbi Colin RN  Outcome: Progressing

## 2023-10-15 NOTE — PLAN OF CARE
Problem: Occupational Therapy - Adult  Goal: By Discharge: Performs self-care activities at highest level of function for planned discharge setting. See evaluation for individualized goals. Description: FUNCTIONAL STATUS PRIOR TO ADMISSION:  independent  Receives Help From: Family, ADL Assistance: Independent,  ,  ,  ,  ,  , Homemaking Assistance: Independent, Ambulation Assistance: Independent, Transfer Assistance: Independent, Active : Yes     HOME SUPPORT: Patient lived with wife but didn't require assistance. Occupational Therapy Goals:  Initiated 10/15/2023  1. Patient will perform lower body dressing with Modified [place] within 7 day(s). 2.  Patient will perform bathing tasks with Modified [place] within 7 day(s). 3.  Patient will perform toilet transfers with Modified [place]  within 7 day(s). 4.  Patient will perform all aspects of toileting with Modified [place] within 7 day(s). 5.  Patient will participate in upper extremity therapeutic exercise/activities with [place] for 10 minutes within 7 day(s). 10/15/2023 1454 by Rose Kenyon OT  Outcome: Progressing  OCCUPATIONAL THERAPY EVALUATION    Patient: Reji Carlos (55 y.o. male)  Date: 10/15/2023  Primary Diagnosis: ETOH abuse [F10.10]  Tachycardia [R00.0]  Cellulitis of toe of right foot [L03.031]  Osteomyelitis of ankle or foot, acute, right (HCC) [M86.171]  Cellulitis and abscess of toe of right foot [L03.031, L02.611]         Precautions: Fall Risk, Weight Bearing (recommending heel WB)                  ASSESSMENT :  The patient is limited by decreased functional mobility, independence in ADLs, ROM, strength, activity tolerance, endurance, balance, increased pain levels following admission for cellulitis of R great toe with abscess. Patient MRI confirms OM. Patient now receiving antibiotics and but with increased complaint to painful R knee.   Noted knee swollen and patient unable to bear

## 2023-10-15 NOTE — PLAN OF CARE
Problem: Physical Therapy - Adult  Goal: By Discharge: Performs mobility at highest level of function for planned discharge setting. See evaluation for individualized goals. Description: FUNCTIONAL STATUS PRIOR TO ADMISSION: Patient was independent and active without use of DME.    HOME SUPPORT PRIOR TO ADMISSION: The patient lived with wife but did not require assistance. Physical Therapy Goals  Initiated 10/15/2023  1. Patient will move from supine to sit and sit to supine in bed with independence within 7 day(s). 2.  Patient will perform sit to stand with modified independence within 7 day(s). 3.  Patient will transfer from bed to chair and chair to bed with supervision/set-up using the least restrictive device within 7 day(s). 4.  Patient will ambulate with contact guard assist for 50'x2 feet with the least restrictive device within 7 day(s). 5.  Patient will ascend/descend 3 stairs with 1 handrail(s) with minimal assistance within 7 day(s). Outcome: Progressing   PHYSICAL THERAPY EVALUATION    Patient: Dixon Davis (61 y.o. male)  Date: 10/15/2023  Primary Diagnosis: ETOH abuse [F10.10]  Tachycardia [R00.0]  Cellulitis of toe of right foot [L03.031]  Osteomyelitis of ankle or foot, acute, right (HCC) [M86.171]  Cellulitis and abscess of toe of right foot [L03.031, L02.611]       Precautions: Fall Risk, Weight Bearing (recommending heel WB)                    ASSESSMENT :   DEFICITS/IMPAIRMENTS:   The patient is limited by decreased functional mobility, independence in ADLs, ROM, strength, sensation, body mechanics, endurance, coordination, balance. Pt admitted due to cellulitis/abscess of R great toe and OM of MTP/ankle. Pt with hx of ETOH abuse and DM. Based on the impairments listed above pt demonstrates inability to extend R knee due to pain to -30 degrees. Pt instructed not to put pillow under R knee and to do quad sets with low load stretch. Noted edema throughout RLE.   Pt \"6-Clicks\" Basic Mobility Scores Predict Discharge Destination After Acute Care Hospitalization in Select Patient Groups: A Retrospective, Observational Study. Arch Rehabil Res Clin Transl. 2022;4(3):136867. doi: 10.1016/j.arrct. 6903.835941. PMID: 01493899; PMCID: [id number]. 4. Marcia Shen Ni P. AM-PAC Short Forms Manual 4.0. Revised 2020. Pain Ratin/10   Pain Intervention(s):   nursing notified, nursing notified and addressing, ice, elevation, and repositioning    Activity Tolerance:   Fair     After treatment:   Patient left in no apparent distress sitting up in chair, Call bell within reach, Caregiver / family present, and Heels elevated for pressure relief    COMMUNICATION/EDUCATION:   The patient's plan of care was discussed with: occupational therapist, registered nurse, and     Patient Education  Education Given To: Patient; Family  Education Provided: Role of Therapy;Plan of Care;Home Exercise Program;Transfer Training;Equipment; Fall Prevention Strategies;Precautions  Education Method: Verbal;Demonstration  Barriers to Learning: None  Education Outcome: Continued education needed    Thank you for this referral.  Eduardo KABA, PT  Minutes: 33      Physical Therapy Evaluation Charge Determination   History Examination Presentation Decision-Making   MEDIUM  Complexity : 1-2 comorbidities / personal factors will impact the outcome/ POC  MEDIUM Complexity : 3 Standardized tests and measures addressin body structure, function, activity limitation and / or participation in recreation  MEDIUM Complexity : Evolving with changing characteristics  AM-PAC  MEDIUM   Based on the above components, the patient evaluation is determined to be of the following

## 2023-10-16 PROBLEM — M86.171 OSTEOMYELITIS OF ANKLE OR FOOT, ACUTE, RIGHT (HCC): Status: ACTIVE | Noted: 2023-10-16

## 2023-10-16 PROBLEM — M86.9 PYOGENIC INFLAMMATION OF BONE (HCC): Status: ACTIVE | Noted: 2023-10-16

## 2023-10-16 PROBLEM — F10.10 ETOH ABUSE: Status: ACTIVE | Noted: 2023-10-16

## 2023-10-16 PROBLEM — M79.671 PAIN OF RIGHT FOOT: Status: ACTIVE | Noted: 2023-10-16

## 2023-10-16 LAB
ANION GAP SERPL CALC-SCNC: 8 MMOL/L (ref 5–15)
APPEARANCE SNV: ABNORMAL
APPEARANCE SNV: ABNORMAL
BODY FLD TYPE: NORMAL
BODY FLD TYPE: NORMAL
BUN SERPL-MCNC: 15 MG/DL (ref 6–20)
BUN/CREAT SERPL: 14 (ref 12–20)
CALCIUM SERPL-MCNC: 8.4 MG/DL (ref 8.5–10.1)
CHLORIDE SERPL-SCNC: 109 MMOL/L (ref 97–108)
CO2 SERPL-SCNC: 21 MMOL/L (ref 21–32)
COLOR SNV: ABNORMAL
COLOR SNV: YELLOW
CREAT SERPL-MCNC: 1.1 MG/DL (ref 0.7–1.3)
CRP SERPL-MCNC: 11.5 MG/DL (ref 0–0.6)
CRYSTALS FLD MICRO: NORMAL
CRYSTALS FLD MICRO: NORMAL
ERYTHROCYTE [SEDIMENTATION RATE] IN BLOOD: 109 MM/HR (ref 0–20)
GLUCOSE SERPL-MCNC: 102 MG/DL (ref 65–100)
LYMPHOCYTES NFR SNV MANUAL: 1 % (ref 0–74)
MONOCYTES NFR SNV MANUAL: 4 % (ref 0–69)
MONOCYTES NFR SNV MANUAL: 9 % (ref 0–69)
NEUTROPHILS NFR SNV MANUAL: 91 % (ref 0–24)
NEUTROPHILS NFR SNV MANUAL: 95 % (ref 0–24)
POTASSIUM SERPL-SCNC: 3.3 MMOL/L (ref 3.5–5.1)
PROCALCITONIN SERPL-MCNC: 0.38 NG/ML
RBC # SNV: >100 /CU MM
RBC # SNV: >100 /CU MM
SODIUM SERPL-SCNC: 138 MMOL/L (ref 136–145)
SPECIMEN SOURCE FLD: ABNORMAL
SPECIMEN SOURCE FLD: ABNORMAL
URATE SERPL-MCNC: 3.8 MG/DL (ref 3.5–7.2)
WBC # SNV: 9970 /CU MM (ref 0–150)
WBC # SNV: ABNORMAL /CU MM (ref 0–150)

## 2023-10-16 PROCEDURE — 36415 COLL VENOUS BLD VENIPUNCTURE: CPT

## 2023-10-16 PROCEDURE — 87075 CULTR BACTERIA EXCEPT BLOOD: CPT

## 2023-10-16 PROCEDURE — 2580000003 HC RX 258: Performed by: PHYSICIAN ASSISTANT

## 2023-10-16 PROCEDURE — 6360000002 HC RX W HCPCS: Performed by: INTERNAL MEDICINE

## 2023-10-16 PROCEDURE — A4216 STERILE WATER/SALINE, 10 ML: HCPCS | Performed by: INTERNAL MEDICINE

## 2023-10-16 PROCEDURE — 84145 PROCALCITONIN (PCT): CPT

## 2023-10-16 PROCEDURE — 1100000000 HC RM PRIVATE

## 2023-10-16 PROCEDURE — 99223 1ST HOSP IP/OBS HIGH 75: CPT | Performed by: INTERNAL MEDICINE

## 2023-10-16 PROCEDURE — 2580000003 HC RX 258: Performed by: HOSPITALIST

## 2023-10-16 PROCEDURE — 6360000002 HC RX W HCPCS: Performed by: HOSPITALIST

## 2023-10-16 PROCEDURE — 87070 CULTURE OTHR SPECIMN AEROBIC: CPT

## 2023-10-16 PROCEDURE — 99222 1ST HOSP IP/OBS MODERATE 55: CPT | Performed by: PHYSICIAN ASSISTANT

## 2023-10-16 PROCEDURE — 86140 C-REACTIVE PROTEIN: CPT

## 2023-10-16 PROCEDURE — 94761 N-INVAS EAR/PLS OXIMETRY MLT: CPT

## 2023-10-16 PROCEDURE — 89050 BODY FLUID CELL COUNT: CPT

## 2023-10-16 PROCEDURE — 2580000003 HC RX 258: Performed by: INTERNAL MEDICINE

## 2023-10-16 PROCEDURE — 97535 SELF CARE MNGMENT TRAINING: CPT

## 2023-10-16 PROCEDURE — 99232 SBSQ HOSP IP/OBS MODERATE 35: CPT | Performed by: PODIATRIST

## 2023-10-16 PROCEDURE — 89060 EXAM SYNOVIAL FLUID CRYSTALS: CPT

## 2023-10-16 PROCEDURE — 85652 RBC SED RATE AUTOMATED: CPT

## 2023-10-16 PROCEDURE — 6360000002 HC RX W HCPCS: Performed by: PHYSICIAN ASSISTANT

## 2023-10-16 PROCEDURE — 0S9D3ZZ DRAINAGE OF LEFT KNEE JOINT, PERCUTANEOUS APPROACH: ICD-10-PCS | Performed by: INTERNAL MEDICINE

## 2023-10-16 PROCEDURE — 6370000000 HC RX 637 (ALT 250 FOR IP): Performed by: INTERNAL MEDICINE

## 2023-10-16 PROCEDURE — 80048 BASIC METABOLIC PNL TOTAL CA: CPT

## 2023-10-16 PROCEDURE — 6370000000 HC RX 637 (ALT 250 FOR IP): Performed by: HOSPITALIST

## 2023-10-16 PROCEDURE — 84550 ASSAY OF BLOOD/URIC ACID: CPT

## 2023-10-16 PROCEDURE — 87205 SMEAR GRAM STAIN: CPT

## 2023-10-16 RX ORDER — ENOXAPARIN SODIUM 100 MG/ML
40 INJECTION SUBCUTANEOUS DAILY
Status: DISCONTINUED | OUTPATIENT
Start: 2023-10-16 | End: 2023-10-16

## 2023-10-16 RX ORDER — ETHYL CHLORIDE 100 %
AEROSOL, SPRAY (ML) TOPICAL ONCE
Status: DISCONTINUED | OUTPATIENT
Start: 2023-10-16 | End: 2023-10-16

## 2023-10-16 RX ORDER — ENOXAPARIN SODIUM 100 MG/ML
40 INJECTION SUBCUTANEOUS DAILY
Status: DISCONTINUED | OUTPATIENT
Start: 2023-10-16 | End: 2023-10-17 | Stop reason: HOSPADM

## 2023-10-16 RX ADMIN — VANCOMYCIN HYDROCHLORIDE 750 MG: 750 INJECTION, POWDER, LYOPHILIZED, FOR SOLUTION INTRAVENOUS at 13:20

## 2023-10-16 RX ADMIN — ENOXAPARIN SODIUM 40 MG: 100 INJECTION SUBCUTANEOUS at 13:20

## 2023-10-16 RX ADMIN — ACETAMINOPHEN 650 MG: 325 TABLET ORAL at 09:40

## 2023-10-16 RX ADMIN — BUPROPION HYDROCHLORIDE 150 MG: 150 TABLET, EXTENDED RELEASE ORAL at 09:33

## 2023-10-16 RX ADMIN — DICLOFENAC SODIUM 2 G: 10 GEL TOPICAL at 19:45

## 2023-10-16 RX ADMIN — ACETAMINOPHEN 650 MG: 325 TABLET ORAL at 00:02

## 2023-10-16 RX ADMIN — WATER 2000 MG: 1 INJECTION INTRAMUSCULAR; INTRAVENOUS; SUBCUTANEOUS at 17:37

## 2023-10-16 RX ADMIN — DAPTOMYCIN 800 MG: 500 INJECTION, POWDER, LYOPHILIZED, FOR SOLUTION INTRAVENOUS at 19:43

## 2023-10-16 RX ADMIN — IBUPROFEN 600 MG: 400 TABLET, FILM COATED ORAL at 15:12

## 2023-10-16 RX ADMIN — Medication 100 MG: at 09:33

## 2023-10-16 RX ADMIN — WATER 20 MG: 1 INJECTION INTRAMUSCULAR; INTRAVENOUS; SUBCUTANEOUS at 19:43

## 2023-10-16 RX ADMIN — PIPERACILLIN AND TAZOBACTAM 3375 MG: 3; .375 INJECTION, POWDER, LYOPHILIZED, FOR SOLUTION INTRAVENOUS at 15:13

## 2023-10-16 RX ADMIN — SODIUM CHLORIDE, PRESERVATIVE FREE 20 ML: 5 INJECTION INTRAVENOUS at 09:42

## 2023-10-16 RX ADMIN — IBUPROFEN 600 MG: 400 TABLET, FILM COATED ORAL at 05:43

## 2023-10-16 RX ADMIN — DICLOFENAC SODIUM 2 G: 10 GEL TOPICAL at 09:34

## 2023-10-16 RX ADMIN — WATER 20 MG: 1 INJECTION INTRAMUSCULAR; INTRAVENOUS; SUBCUTANEOUS at 15:16

## 2023-10-16 RX ADMIN — PIPERACILLIN AND TAZOBACTAM 3375 MG: 3; .375 INJECTION, POWDER, LYOPHILIZED, FOR SOLUTION INTRAVENOUS at 06:21

## 2023-10-16 RX ADMIN — SODIUM CHLORIDE, PRESERVATIVE FREE 10 ML: 5 INJECTION INTRAVENOUS at 19:44

## 2023-10-16 RX ADMIN — VANCOMYCIN HYDROCHLORIDE 750 MG: 750 INJECTION, POWDER, LYOPHILIZED, FOR SOLUTION INTRAVENOUS at 06:22

## 2023-10-16 ASSESSMENT — PAIN DESCRIPTION - ORIENTATION: ORIENTATION: RIGHT

## 2023-10-16 ASSESSMENT — PAIN DESCRIPTION - LOCATION
LOCATION: KNEE
LOCATION: KNEE

## 2023-10-16 ASSESSMENT — PAIN DESCRIPTION - DESCRIPTORS
DESCRIPTORS: SHARP
DESCRIPTORS: TIGHTNESS

## 2023-10-16 ASSESSMENT — PAIN SCALES - GENERAL
PAINLEVEL_OUTOF10: 3
PAINLEVEL_OUTOF10: 2

## 2023-10-16 NOTE — PLAN OF CARE
Problem: Occupational Therapy - Adult  Goal: By Discharge: Performs self-care activities at highest level of function for planned discharge setting. See evaluation for individualized goals. Description: FUNCTIONAL STATUS PRIOR TO ADMISSION:  independent  Receives Help From: Family, ADL Assistance: Independent,  ,  ,  ,  ,  , Homemaking Assistance: Independent, Ambulation Assistance: Independent, Transfer Assistance: Independent, Active : Yes     HOME SUPPORT: Patient lived with wife but didn't require assistance. Occupational Therapy Goals:  Initiated 10/15/2023  1. Patient will perform lower body dressing with Modified [place] within 7 day(s). 2.  Patient will perform bathing tasks with Modified [place] within 7 day(s). 3.  Patient will perform toilet transfers with Modified [place]  within 7 day(s). 4.  Patient will perform all aspects of toileting with Modified [place] within 7 day(s). 5.  Patient will participate in upper extremity therapeutic exercise/activities with [place] for 10 minutes within 7 day(s). Outcome: Progressing   OCCUPATIONAL THERAPY TREATMENT  Patient: Ml Reza (59 y.o. male)  Date: 10/16/2023  Primary Diagnosis: ETOH abuse [F10.10]  Tachycardia [R00.0]  Cellulitis of toe of right foot [L03.031]  Osteomyelitis of ankle or foot, acute, right (HCC) [M86.171]  Cellulitis and abscess of toe of right foot [L03.031, L02.611]       Precautions: Fall Risk, Weight Bearing (recommending heel WB)                Chart, occupational therapy assessment, plan of care, and goals were reviewed. ASSESSMENT  Patient continues to benefit from skilled OT services and is progressing towards goals. Pt sat edge of bed for ADL's. Spouse present for tx. He was independent with UB bathe, set-up to doff/don gown. Pt used shampoo cap and than stoo with assist x 1 to use bath cloths wander area/buttocks.  Pt able to stand with less assist than last tx session however

## 2023-10-16 NOTE — PROGRESS NOTES
Post Discharge PICC and Antibiotic Orders    1. Diagnosis:  RLE first MTP septic arthritis and osteomyelitis of the first metatarsal and first proximal phalanx. No culture data  2. Antibiotic: Daptomycin 800 mg IV daily through 11/26/2023                        Ceftriaxone 2 g IV daily through 11/26/2023  3. Routine PICC/ Clemencia/ Portacath Care including PRN catheter flow management  4. Weekly labs:   [x] CBC/diff/platelets   [x] BUN/Creatinine   [x] CPK   [x] AST/TotalBilirubin/AlkalinePhosphatase   [x] CRP   []Trough Vancomycin level goal 15-20  5. Fax lab to [phone]  Call Critical Lab Values to [phone]  6. May send to IR for line evaluation or replacement -[phone]  7. Home Health to pull PIC line at end of therapy or send to IR for Clemencia removal  8.   Allergies:  No Known Allergies      Monica Line, DO

## 2023-10-16 NOTE — PROGRESS NOTES
Right great toe edematous.   No erythema or warmth   Skin: No acute rash on exposed skin   Lymph nodes:    Musculoskeletal:    Lines/Devices:  Peripheral   Psych: Alert and oriented, normal mood affect given the setting       Data Review:   Recent Results (from the past 24 hour(s))   Basic Metabolic Panel    Collection Time: 10/16/23  2:17 AM   Result Value Ref Range    Sodium 138 136 - 145 mmol/L    Potassium 3.3 (L) 3.5 - 5.1 mmol/L    Chloride 109 (H) 97 - 108 mmol/L    CO2 21 21 - 32 mmol/L    Anion Gap 8 5 - 15 mmol/L    Glucose 102 (H) 65 - 100 mg/dL    BUN 15 6 - 20 MG/DL    Creatinine 1.10 0.70 - 1.30 MG/DL    Bun/Cre Ratio 14 12 - 20      Est, Glom Filt Rate >60 >60 ml/min/1.73m2    Calcium 8.4 (L) 8.5 - 10.1 MG/DL   C-Reactive Protein    Collection Time: 10/16/23  9:55 AM   Result Value Ref Range    CRP 11.50 (H) 0.00 - 0.60 mg/dL   Sedimentation Rate    Collection Time: 10/16/23  9:55 AM   Result Value Ref Range    Sed Rate, Automated 109 (H) 0 - 20 mm/hr   Procalcitonin    Collection Time: 10/16/23  9:55 AM   Result Value Ref Range    Procalcitonin 0.38 ng/mL   Uric Acid    Collection Time: 10/16/23  9:55 AM   Result Value Ref Range    Uric Acid 3.8 3.5 - 7.2 MG/DL   Synovial fluid, cell count    Collection Time: 10/16/23  1:22 PM   Result Value Ref Range    Sample Site KNEE      Color, Fluid DARK YELLOW      Appearance, Fluid CLOUDY      RBC, Synovial Fluid >100 (H) 0 /cu mm    SYN WBC Count 14,044 (H) 0 - 150 /cu mm   Crystals, Body Fluid    Collection Time: 10/16/23  1:22 PM   Result Value Ref Range    Fluid Type KNEE      Crystals, Fluid FEW EXTRACELLUAR    Synovial fluid, cell count    Collection Time: 10/16/23  1:22 PM   Result Value Ref Range    Sample Site LT KNEE     Color, Fluid YELLOW      Appearance, Fluid CLOUDY      RBC, Synovial Fluid >100 (H) 0 /cu mm    SYN WBC Count 9,970 (H) 0 - 150 /cu mm   Crystals, Body Fluid    Collection Time: 10/16/23  1:22 PM   Result Value Ref Range    Fluid Type KNEE     Crystals, Fluid FEW EXTRACELLUAR         Microbiology:      Studies:      Signed By: Cierra July, DO October 16, 2023

## 2023-10-16 NOTE — PLAN OF CARE
Problem: Discharge Planning  Goal: Discharge to home or other facility with appropriate resources  Outcome: Progressing     Problem: Safety - Adult  Goal: Free from fall injury  Outcome: Progressing     Problem: Pain  Goal: Verbalizes/displays adequate comfort level or baseline comfort level  Outcome: Progressing     Problem: Occupational Therapy - Adult  Goal: By Discharge: Performs self-care activities at highest level of function for planned discharge setting. See evaluation for individualized goals. Description: FUNCTIONAL STATUS PRIOR TO ADMISSION:  independent  Receives Help From: Family, ADL Assistance: Independent,  ,  ,  ,  ,  , Homemaking Assistance: Independent, Ambulation Assistance: Independent, Transfer Assistance: Independent, Active : Yes     HOME SUPPORT: Patient lived with wife but didn't require assistance. Occupational Therapy Goals:  Initiated 10/15/2023  1. Patient will perform lower body dressing with Modified [place] within 7 day(s). 2.  Patient will perform bathing tasks with Modified [place] within 7 day(s). 3.  Patient will perform toilet transfers with Modified [place]  within 7 day(s). 4.  Patient will perform all aspects of toileting with Modified [place] within 7 day(s). 5.  Patient will participate in upper extremity therapeutic exercise/activities with [place] for 10 minutes within 7 day(s). 10/15/2023 1454 by Kylee Redding OT  Outcome: Progressing  10/15/2023 1452 by Kylee Redding OT  Outcome: Progressing     Problem: Physical Therapy - Adult  Goal: By Discharge: Performs mobility at highest level of function for planned discharge setting. See evaluation for individualized goals. Description: FUNCTIONAL STATUS PRIOR TO ADMISSION: Patient was independent and active without use of DME.    HOME SUPPORT PRIOR TO ADMISSION: The patient lived with wife but did not require assistance.     Physical Therapy Goals  Initiated

## 2023-10-17 ENCOUNTER — APPOINTMENT (OUTPATIENT)
Facility: HOSPITAL | Age: 53
DRG: 540 | End: 2023-10-17
Payer: COMMERCIAL

## 2023-10-17 VITALS
SYSTOLIC BLOOD PRESSURE: 138 MMHG | OXYGEN SATURATION: 100 % | HEART RATE: 77 BPM | TEMPERATURE: 99 F | RESPIRATION RATE: 16 BRPM | WEIGHT: 218.26 LBS | BODY MASS INDEX: 28.01 KG/M2 | DIASTOLIC BLOOD PRESSURE: 84 MMHG | HEIGHT: 74 IN

## 2023-10-17 PROBLEM — M10.9 URIC ACID ARTHROPATHY: Status: ACTIVE | Noted: 2023-10-17

## 2023-10-17 LAB
ANION GAP SERPL CALC-SCNC: 8 MMOL/L (ref 5–15)
BACTERIA SPEC CULT: NORMAL
BACTERIA SPEC CULT: NORMAL
BASOPHILS # BLD: 0 K/UL (ref 0–0.1)
BASOPHILS NFR BLD: 0 % (ref 0–1)
BUN SERPL-MCNC: 12 MG/DL (ref 6–20)
BUN/CREAT SERPL: 11 (ref 12–20)
CALCIUM SERPL-MCNC: 9.1 MG/DL (ref 8.5–10.1)
CHLORIDE SERPL-SCNC: 111 MMOL/L (ref 97–108)
CK SERPL-CCNC: 46 U/L (ref 39–308)
CO2 SERPL-SCNC: 20 MMOL/L (ref 21–32)
CREAT SERPL-MCNC: 1.08 MG/DL (ref 0.7–1.3)
DIFFERENTIAL METHOD BLD: ABNORMAL
EOSINOPHIL # BLD: 0 K/UL (ref 0–0.4)
EOSINOPHIL NFR BLD: 0 % (ref 0–7)
ERYTHROCYTE [DISTWIDTH] IN BLOOD BY AUTOMATED COUNT: 13.1 % (ref 11.5–14.5)
GLUCOSE SERPL-MCNC: 181 MG/DL (ref 65–100)
HCT VFR BLD AUTO: 36.1 % (ref 36.6–50.3)
HGB BLD-MCNC: 11.9 G/DL (ref 12.1–17)
IMM GRANULOCYTES # BLD AUTO: 0.1 K/UL (ref 0–0.04)
IMM GRANULOCYTES NFR BLD AUTO: 1 % (ref 0–0.5)
LYMPHOCYTES # BLD: 0.6 K/UL (ref 0.8–3.5)
LYMPHOCYTES NFR BLD: 7 % (ref 12–49)
MCH RBC QN AUTO: 36 PG (ref 26–34)
MCHC RBC AUTO-ENTMCNC: 33 G/DL (ref 30–36.5)
MCV RBC AUTO: 109.1 FL (ref 80–99)
MONOCYTES # BLD: 0.9 K/UL (ref 0–1)
MONOCYTES NFR BLD: 10 % (ref 5–13)
NEUTS SEG # BLD: 7 K/UL (ref 1.8–8)
NEUTS SEG NFR BLD: 82 % (ref 32–75)
NRBC # BLD: 0 K/UL (ref 0–0.01)
NRBC BLD-RTO: 0 PER 100 WBC
PLATELET # BLD AUTO: 202 K/UL (ref 150–400)
PMV BLD AUTO: 8.8 FL (ref 8.9–12.9)
POTASSIUM SERPL-SCNC: 3.7 MMOL/L (ref 3.5–5.1)
RBC # BLD AUTO: 3.31 M/UL (ref 4.1–5.7)
RBC MORPH BLD: ABNORMAL
SERVICE CMNT-IMP: NORMAL
SERVICE CMNT-IMP: NORMAL
SODIUM SERPL-SCNC: 139 MMOL/L (ref 136–145)
WBC # BLD AUTO: 8.6 K/UL (ref 4.1–11.1)

## 2023-10-17 PROCEDURE — 85025 COMPLETE CBC W/AUTO DIFF WBC: CPT

## 2023-10-17 PROCEDURE — 2580000003 HC RX 258: Performed by: PHYSICIAN ASSISTANT

## 2023-10-17 PROCEDURE — 2580000003 HC RX 258: Performed by: HOSPITALIST

## 2023-10-17 PROCEDURE — 6360000002 HC RX W HCPCS: Performed by: INTERNAL MEDICINE

## 2023-10-17 PROCEDURE — 99232 SBSQ HOSP IP/OBS MODERATE 35: CPT | Performed by: INTERNAL MEDICINE

## 2023-10-17 PROCEDURE — 80048 BASIC METABOLIC PNL TOTAL CA: CPT

## 2023-10-17 PROCEDURE — 2580000003 HC RX 258: Performed by: INTERNAL MEDICINE

## 2023-10-17 PROCEDURE — 02HV33Z INSERTION OF INFUSION DEVICE INTO SUPERIOR VENA CAVA, PERCUTANEOUS APPROACH: ICD-10-PCS | Performed by: INTERNAL MEDICINE

## 2023-10-17 PROCEDURE — 76937 US GUIDE VASCULAR ACCESS: CPT

## 2023-10-17 PROCEDURE — A4216 STERILE WATER/SALINE, 10 ML: HCPCS | Performed by: INTERNAL MEDICINE

## 2023-10-17 PROCEDURE — 6370000000 HC RX 637 (ALT 250 FOR IP): Performed by: HOSPITALIST

## 2023-10-17 PROCEDURE — 6370000000 HC RX 637 (ALT 250 FOR IP): Performed by: INTERNAL MEDICINE

## 2023-10-17 PROCEDURE — 82550 ASSAY OF CK (CPK): CPT

## 2023-10-17 PROCEDURE — 6360000002 HC RX W HCPCS: Performed by: PHYSICIAN ASSISTANT

## 2023-10-17 PROCEDURE — 94761 N-INVAS EAR/PLS OXIMETRY MLT: CPT

## 2023-10-17 PROCEDURE — 97116 GAIT TRAINING THERAPY: CPT

## 2023-10-17 PROCEDURE — 99231 SBSQ HOSP IP/OBS SF/LOW 25: CPT | Performed by: NURSE PRACTITIONER

## 2023-10-17 PROCEDURE — 36415 COLL VENOUS BLD VENIPUNCTURE: CPT

## 2023-10-17 RX ORDER — IBUPROFEN 600 MG/1
600 TABLET ORAL EVERY 6 HOURS PRN
Qty: 15 TABLET | Refills: 0 | Status: SHIPPED
Start: 2023-10-17

## 2023-10-17 RX ORDER — FAMOTIDINE 20 MG/1
20 TABLET, FILM COATED ORAL 2 TIMES DAILY
Qty: 30 TABLET | Refills: 0 | Status: SHIPPED
Start: 2023-10-17 | End: 2023-11-01

## 2023-10-17 RX ORDER — PREDNISONE 10 MG/1
TABLET ORAL
Qty: 9 TABLET | Refills: 0 | Status: SHIPPED | OUTPATIENT
Start: 2023-10-17 | End: 2023-10-23

## 2023-10-17 RX ADMIN — SODIUM CHLORIDE, PRESERVATIVE FREE 10 ML: 5 INJECTION INTRAVENOUS at 08:31

## 2023-10-17 RX ADMIN — ENOXAPARIN SODIUM 40 MG: 100 INJECTION SUBCUTANEOUS at 08:30

## 2023-10-17 RX ADMIN — DAPTOMYCIN 800 MG: 500 INJECTION, POWDER, LYOPHILIZED, FOR SOLUTION INTRAVENOUS at 16:25

## 2023-10-17 RX ADMIN — Medication 100 MG: at 08:30

## 2023-10-17 RX ADMIN — WATER 20 MG: 1 INJECTION INTRAMUSCULAR; INTRAVENOUS; SUBCUTANEOUS at 08:30

## 2023-10-17 RX ADMIN — WATER 2000 MG: 1 INJECTION INTRAMUSCULAR; INTRAVENOUS; SUBCUTANEOUS at 16:19

## 2023-10-17 RX ADMIN — DICLOFENAC SODIUM 2 G: 10 GEL TOPICAL at 08:31

## 2023-10-17 RX ADMIN — BUPROPION HYDROCHLORIDE 150 MG: 150 TABLET, EXTENDED RELEASE ORAL at 08:30

## 2023-10-17 NOTE — CONSULTS
Collection Time: 10/16/23  1:22 PM   Result Value Ref Range    Fluid Type KNEE     Crystals, Fluid FEW EXTRACELLUAR          Impression:     Patient Active Problem List    Diagnosis Date Noted    ETOH abuse 10/16/2023    Osteomyelitis of ankle or foot, acute, right ([address]) 10/16/2023    Pain of right foot 10/16/2023    Pyogenic inflammation of bone ([address]) 10/16/2023    Septic arthritis of right foot ([address]) 10/13/2023    Acute osteomyelitis of metatarsal bone of right foot ([address]) 10/13/2023    Alcohol dependence ([address]) 10/13/2023    Cellulitis of toe of right foot 10/11/2023    Eczema 03/09/2010    Depression 03/09/2010    High cholesterol 03/09/2010     Principal Problem:    Cellulitis of toe of right foot  Active Problems:    Depression    Septic arthritis of right foot (HCC)    Acute osteomyelitis of metatarsal bone of right foot (HCC)    Alcohol dependence (HCC)    ETOH abuse    Osteomyelitis of ankle or foot, acute, right (HCC)    Pain of right foot    Pyogenic inflammation of bone (HCC)  Resolved Problems:    * No resolved hospital problems. *      Plan:   A: 1. Right knee effusion concerning for gouty arthropathy  2. Left knee effusion concerning for gout arthropathy  3. Right 1st MTP septic arthritis and proximal phalanx/distal metatarsal osteomyelitis    P: 1. Knees: We went over the treatment options for the patient today. I explained to he and his wife, that this is likely a gouty arthropathy due to his history, polyarthralgia, and clinical picture. For definitive diagnosis, I recommended BL knee arthrocentesis. If this shows uric acid crystals I would start IV solu-medrol 20 mg BiD for him. I went over the risks of the arthrocentesis which included seeding of the joint, persistent pain, small vessel damage, and need for further arthrocentesis. He agreed to the procedure. See procedure note. 2. DVT ppx: Start lovenox now  3. DISPO: Home on IV abx due to right 1st MTP septic arthritis.      Discussed case with Dr. Joshua Aj who agrees with plan.     Marcos Ochoa, [place]   Orthopaedic Surgery PA  [address] [facility]

## 2023-10-17 NOTE — PLAN OF CARE
Problem: Physical Therapy - Adult  Goal: By Discharge: Performs mobility at highest level of function for planned discharge setting. See evaluation for individualized goals. Description: FUNCTIONAL STATUS PRIOR TO ADMISSION: Patient was independent and active without use of DME.    HOME SUPPORT PRIOR TO ADMISSION: The patient lived with wife but did not require assistance. Physical Therapy Goals  Initiated 10/15/2023  1. Patient will move from supine to sit and sit to supine in bed with independence within 7 day(s). 2.  Patient will perform sit to stand with modified independence within 7 day(s). 3.  Patient will transfer from bed to chair and chair to bed with supervision/set-up using the least restrictive device within 7 day(s). 4.  Patient will ambulate with contact guard assist for 50'x2 feet with the least restrictive device within 7 day(s). 5.  Patient will ascend/descend 3 stairs with 1 handrail(s) with minimal assistance within 7 day(s). Outcome: Progressing   PHYSICAL THERAPY TREATMENT    Patient: Alex López (13 y.o. male)  Date: 10/17/2023  Diagnosis: ETOH abuse [F10.10]  Tachycardia [R00.0]  Cellulitis of toe of right foot [L03.031]  Osteomyelitis of ankle or foot, acute, right (HCC) [M86.171]  Cellulitis and abscess of toe of right foot [L03.031, L02.611] Cellulitis of toe of right foot      Precautions: Fall Risk, Weight Bearing (recommending heel WB)                    ASSESSMENT:  Patient continues to benefit from skilled PT services and is progressing towards goals. Patient reports significant improvement in pain and ambulation tolerance since start of steroids and aspiration of fluid from his R knee. Today, indep with bed mob and transfers and ambulates with RW with only SBA. Trialed gait without but much slower and more antalgic, still recommend RW for offloading as needed. Completed stair training in hallway with L rail with SBA and no safety concerns.   Patient is cleared

## 2023-10-17 NOTE — PROCEDURES
After consent was obtained and the risks and benefits were discussed, the right knee was prepped in sterile fashion with 3 chloraprep sticks. Following this, ethyl chloride sterile was applied to the superolateral knee. An 18 gauge needle was entered into the knee utilizing a superolateral approach. 60 cc of yellow fluid was aspirated. This was sent for culture, gram stain, cell count, and crystal analysis. .  Sterile bandaid was applied. Patient was NVI after the procedure. After consent was obtained and the risks and benefits were discussed, the left knee was prepped in sterile fashion with 3 chloraprep sticks. Following this, ethyl chloride sterile was applied to the superolateral knee. An 18 gauge needle was entered into the knee utilizing a superolateral approach. 60 cc of yellow fluid was aspirated. This was sent for culture, gram stain, cell count, and crystal analysis. .  Sterile bandaid was applied. Patient was NVI after the procedure.               Mitchell Moran PA-C  Orthopaedic Surgery [address]

## 2023-10-17 NOTE — PROGRESS NOTES
Spiritual Care Assessment/Progress Note  [address] [facility]    Name: Camille Ames MRN: [id number]    Age: 48 y.o. Sex: male   Language: English     Date: 10/17/2023            Total Time Calculated: 10 min              Spiritual Assessment begun in [facility] 4M POST SURG ORT 1  Service Provided For[de-identified] Patient  Referral/Consult From[de-identified] Rounding  Encounter Overview/Reason : Initial Encounter    Spiritual beliefs:      [] Involved in a skyler tradition/spiritual practice:      [] Supported by a skyler community:      [] Claims no spiritual orientation:      [] Seeking spiritual identity:           [] Adheres to an individual form of spirituality:      [x] Not able to assess:                Identified resources for coping and support system:   Support System: Spouse       [] Prayer                  [] Devotional reading               [] Music                  [] Guided Imagery     [] Pet visits                                        [] Other: (COMMENT)     Specific area/focus of visit   Encounter:    Crisis:    Spiritual/Emotional needs:    Ritual, Rites and Sacraments:    Grief, Loss, and Adjustments:    Ethics/Mediation:    Behavioral Health:    Palliative Care: Advance Care Planning:      Plan/Referrals:  (contact spiritual care for further assistance if needed.)    Narrative:  rounded on MED SURG floor. Patient is hospitalized due to medical issues. Patient lives at home with spouse and previous wife's son. Patient is concerned about his medical status and is thankful for the visit.  met and conversed with patient. Patient is of the [address] [place] [address] [employer] and works for [employer]. Patient is looking forward to discharge from the hospital and go home and be present with his family. I am available if needed. Contact spiritual care if further assistance is needed.    [facility]  Page Spiritual Care to [phone]

## 2023-10-17 NOTE — DISCHARGE INSTRUCTIONS
[address] [facility] 2023       RE: Alex López      To Whom It May Concern: This is to certify that Alex López was hospitalized at [address] [place] [address] from 10/11/2023 to 10/17/2023 and will be on treatment with IV antibiotics until 23. Any assistance accorded to him will be greatly appreciated. Please feel free to contact my office at [phone] if you have any questions or concerns. Thank you for your assistance in this matter. Sincerely,  Samantha Perez, [address] INSTRUCTIONS    NAME:   Alex López   :  1970   MRN:  [id number]     Date:     10/17/2023    ADMIT DATE: 10/11/2023     DISCHARGE DATE: 10/17/2023     PRINCIPAL ADMITTING DIAGNOSIS:  ETOH abuse [F10.10]  Cellulitis of toe of right foot [L03.031]  Osteomyelitis of ankle or foot, acute, right (HCC) [M86.171]  Cellulitis and abscess of toe of right foot [L03.031, L02.611]    Discharge Diagnoses:    MEDICATIONS:    It is important that medications are taken exactly as they are prescribed on the discharge medication instructions and keep them your  in the bottles provided by the pharmacist.   Keep a list of the medication names, dosages, and times to be taken at all times. Do not take other medications without consulting your doctor.      Recommended diet:  regular diet    Recommended activity: activity as tolerated    Post discharge care:    Notify follow up health care provider or return to the emergency department if you cannot get hold of your doctor if you feel worse or experience symptoms similar to those that brought you to hospital    [facility]  [facility], [address]  [phone]  Follow up  IV antibiotic provider    [facility]  [phone]    Follow up  Harris Santos, Call if you do not receive a call within 24

## 2023-10-17 NOTE — CARE COORDINATION
10/17/2023  1:12 PM  Care Management Progress Note      ICD-10-CM    1. Cellulitis of toe of right foot  L03.031       2. Tachycardia  R00.0       3. ETOH abuse  F10.10       4. Osteomyelitis of ankle or foot, acute, right ([address])  M86.171       5. Edema of right lower leg  R60.0 Vascular duplex lower extremity venous right     Vascular duplex lower extremity venous right          RUR:  7%  Risk Level: [x]Low []Moderate []High    Transition of care plan:  Discharge order submitted. Pt has medically cleared. PICC to be placed prior to DC. Pt will need first dose of home IV abx prior to DC if not already received. Home with IV abx and HH - Finalized IV abx submitted via [employer] to [employer], and pt is covered at 100%. [employer] will provide teaching to wife (a nurse) at 2:00 PM. 1 [facility] accepted pt for SN, PT, OT, and they are aware of pt's discharge today. Outpatient follow-up. Pt's family to transport. 10/12/23 1603   Service Assessment   Patient Orientation Alert and Oriented   Cognition Alert   History Provided By Significant Other   Primary Caregiver Self   Support Systems Spouse/Significant Other   Patient's Healthcare Decision Maker is: Legal Next of Kin   PCP Verified by CM Yes  Prabhjot Cheng - seen in June 2023.)   Last Visit to PCP Within last 6 months   Prior Functional Level Independent in ADLs/IADLs   Current Functional Level Independent in ADLs/IADLs   Can patient return to prior living arrangement Yes   Ability to make needs known: Good   Family able to assist with home care needs: Yes   Would you like for me to discuss the discharge plan with any other family members/significant others, and if so, who?  Yes   Financial Resources Other (Comment)   Community Resources None   Social/Functional History   Lives With Spouse   Type of [facility] [place]  One level   Home Equipment None   Receives Help From [facility]

## 2023-10-17 NOTE — PROGRESS NOTES
Cell count and crystal analysis of the bilateral knees is consistent with uric acid arthropathy. Uric acid crystals seen on both aspirates. Start IV solu-medfrol 20 mg BID for 4 days and then taper. Follow cultures, but unlikely this is going to be infectious due to his cell counts and crystals seen on microscopy.        Jason Romero PA-C  Orthopaedic Surgery PA  [address] [facility] [address] [facility] [address]

## 2023-10-17 NOTE — DISCHARGE SUMMARY
[address] [place] [address]  Tel: [phone]    Hospital Medicine Discharge Summary    Patient ID:    Ezequiel Corrales  Age:              48 y.o.    : 1970  MRN:             [id number]     PCP: None, None     Date of Admission: 10/11/2023    Date of Discharge:  10/17/2023    Discharge Diagnoses:  Principal Problem:    Cellulitis of toe of right foot    Septic arthritis of right foot ([address])    Acute osteomyelitis of metatarsal bone of right foot ([address])    Depression    Alcohol dependence ([address])    ETOH abuse    Pain of right foot    Uric acid arthropathy    Reason for admission:    ETOH abuse [F10.10]  Tachycardia [R00.0]  Cellulitis of toe of right foot [L03.031]  Osteomyelitis of ankle or foot, acute, right (HCC) [M86.171]  Cellulitis and abscess of toe of right foot [L03.031, L02.611]    Diagnostic testing:    Laboratory data reviewed and independently interpreted:    Recent Labs     10/17/23  0320   WBC 8.6   HGB 11.9*   HCT 36.1*   RBC 3.31*   .1*   MCH 36.0*        No results found for: \"LACTA\"  Recent Labs     10/16/23  0217 10/17/23  0320    139   K 3.3* 3.7   * 111*   CO2 21 20*   GLUCOSE 102* 181*   BUN 15 12   CREATININE 1.10 1.08   CALCIUM 8.4* 9.1     No components found for: \"GLUCOSEPOC\"  No results found for: \"CHOL\", \"TRIG\", \"HDL\", \"LDLCALC\", \"LABVLDL\"    Imaging data reviewed:    XR KNEE RIGHT (1-2 VIEWS)    Result Date: 10/15/2023  Large joint effusion. No acute osseous abnormality. XR KNEE LEFT (1-2 VIEWS)    Result Date: 10/15/2023  Large joint effusion. No acute osseous abnormality. MRI FOOT RIGHT W WO CONTRAST    Result Date: 10/12/2023  Given the clinical history, imaging findings are most compatible with first MTP septic arthritis. Osteomyelitis of the first metatarsal and first proximal phalanx. Surrounding cellulitis. No abscess.      XR FOOT RIGHT

## 2023-10-20 LAB
BACTERIA SPEC CULT: NORMAL
GRAM STN SPEC: NORMAL
SERVICE CMNT-IMP: NORMAL

## 2023-11-02 ENCOUNTER — TELEPHONE (OUTPATIENT)
Age: 53
End: 2023-11-02

## 2023-11-02 ENCOUNTER — OFFICE VISIT (OUTPATIENT)
Age: 53
End: 2023-11-02
Payer: COMMERCIAL

## 2023-11-02 VITALS
HEART RATE: 87 BPM | SYSTOLIC BLOOD PRESSURE: 122 MMHG | HEIGHT: 74 IN | WEIGHT: 218 LBS | DIASTOLIC BLOOD PRESSURE: 84 MMHG | OXYGEN SATURATION: 99 % | TEMPERATURE: 97.9 F | BODY MASS INDEX: 27.98 KG/M2

## 2023-11-02 DIAGNOSIS — M86.171 ACUTE OSTEOMYELITIS OF METATARSAL BONE OF RIGHT FOOT (HCC): ICD-10-CM

## 2023-11-02 DIAGNOSIS — M1A.0710 CHRONIC GOUT OF RIGHT FOOT, UNSPECIFIED CAUSE: Primary | ICD-10-CM

## 2023-11-02 PROCEDURE — 99213 OFFICE O/P EST LOW 20 MIN: CPT | Performed by: PODIATRIST

## 2023-11-02 NOTE — TELEPHONE ENCOUNTER
Patient calling. States he was seen today and discuss with Dr. Sky Addison about getting a prescription for Allopurinol. Can you please send prescription into pharmacy if appropriate. Verified pharmacy in [address] [place] [address].

## 2023-11-06 ENCOUNTER — TELEPHONE (OUTPATIENT)
Age: 53
End: 2023-11-06

## 2023-11-06 NOTE — TELEPHONE ENCOUNTER
Made contact with patient via phone call. Informed him he Dr Terence Brooks instructed he would have to have uric acid testing done prior to getting the allopurinol prescription from Dr Terence Brooks. Patient verbalized understanding. Patient had no further questions.

## 2023-11-07 ENCOUNTER — TELEPHONE (OUTPATIENT)
Age: 53
End: 2023-11-07

## 2023-11-07 NOTE — TELEPHONE ENCOUNTER
Patient notified of Dr. Tatiana Mnior recommendations. Patient states [facility] comes out on a regular basis to address his PICC line.      Confirmed patient thru [facility] and faxed orders for uric acid level to [phone]    [facility] [phone]

## 2023-11-07 NOTE — TELEPHONE ENCOUNTER
Left message for patient to call office. Have tried several times to fax to [facility] but no answer. I have tried called as well and no answer. Let patient know Dr. Estephanie Haskins recommendations & ask if patient wants to come by to  order.

## 2023-11-10 ASSESSMENT — ENCOUNTER SYMPTOMS
VOMITING: 0
SHORTNESS OF BREATH: 0
DIARRHEA: 0
ABDOMINAL PAIN: 0

## 2023-11-16 ENCOUNTER — OFFICE VISIT (OUTPATIENT)
Age: 53
End: 2023-11-16
Payer: COMMERCIAL

## 2023-11-16 VITALS
BODY MASS INDEX: 27.98 KG/M2 | SYSTOLIC BLOOD PRESSURE: 121 MMHG | WEIGHT: 218 LBS | TEMPERATURE: 97.5 F | HEART RATE: 94 BPM | DIASTOLIC BLOOD PRESSURE: 84 MMHG | OXYGEN SATURATION: 100 % | HEIGHT: 74 IN

## 2023-11-16 DIAGNOSIS — M86.171 ACUTE OSTEOMYELITIS OF METATARSAL BONE OF RIGHT FOOT (HCC): ICD-10-CM

## 2023-11-16 DIAGNOSIS — M1A.0710 CHRONIC GOUT OF RIGHT FOOT, UNSPECIFIED CAUSE: Primary | ICD-10-CM

## 2023-11-16 DIAGNOSIS — L84 CORNS AND CALLOSITIES: ICD-10-CM

## 2023-11-16 PROCEDURE — 99213 OFFICE O/P EST LOW 20 MIN: CPT | Performed by: PODIATRIST

## 2023-11-24 ASSESSMENT — ENCOUNTER SYMPTOMS
ABDOMINAL PAIN: 0
DIARRHEA: 0
VOMITING: 0
SHORTNESS OF BREATH: 0

## 2023-11-24 NOTE — PROGRESS NOTES
[address] [facility] FOOT SURGERY         Patient Name: Marcello Amezquita    : 1970    Visit Date: 2023    Office Visit Note      Subjective:         Patient is a 48 y.o. male who is being seen in office follow up visit for pain to the right foot. Patient has history gout and continues to have pain on multiple joint. Patient has appt with rheumatologist in 6 months. Wound right foot has resolved. .    Past Medical History:   Diagnosis Date    History of vascular access device 10/17/2023    single lumen PICC     No past surgical history on file. No family history on file. Social History     Tobacco Use    Smoking status: Never    Smokeless tobacco: Never   Substance Use Topics    Alcohol use: Not on file     No Known Allergies  Prior to Admission medications    Medication Sig Start Date End Date Taking? Authorizing Provider   ibuprofen (ADVIL;MOTRIN) 600 MG tablet Take 1 tablet by mouth every 6 hours as needed for Pain Can get this OTC 10/17/23  Yes Selwyn Juares MD   Testosterone 1.62 % GEL Place 1 Pump onto the skin daily Max Daily Amount: 1 Pump   Yes ProviderLaine MD   buPROPion (WELLBUTRIN XL) 150 MG extended release tablet Take 1 tablet by mouth every morning   Yes ProviderLaine MD   famotidine (PEPCID) 20 MG tablet Take 1 tablet by mouth 2 times daily for 15 days OK to obtain this OTC 10/17/23 11/1/23  Selwyn Juares MD       Review of Systems   Constitutional:  Negative for activity change, appetite change, chills, fatigue and fever. HENT:  Negative for ear pain. Respiratory:  Negative for shortness of breath. Cardiovascular:  Negative for leg swelling. Gastrointestinal:  Negative for abdominal pain, diarrhea and vomiting. Musculoskeletal:  Positive for arthralgias and joint swelling. Neurological:  Negative for weakness. Psychiatric/Behavioral:  Negative for behavioral problems. The patient is not nervous/anxious.            Objective:     Vitals:

## 2023-12-21 ENCOUNTER — OFFICE VISIT (OUTPATIENT)
Age: 53
End: 2023-12-21
Payer: COMMERCIAL

## 2023-12-21 VITALS
HEIGHT: 74 IN | OXYGEN SATURATION: 96 % | RESPIRATION RATE: 20 BRPM | TEMPERATURE: 97.9 F | DIASTOLIC BLOOD PRESSURE: 72 MMHG | WEIGHT: 221.4 LBS | BODY MASS INDEX: 28.42 KG/M2 | HEART RATE: 96 BPM | SYSTOLIC BLOOD PRESSURE: 138 MMHG

## 2023-12-21 DIAGNOSIS — L97.512 ULCER OF RIGHT FOOT WITH FAT LAYER EXPOSED (HCC): Primary | ICD-10-CM

## 2023-12-21 PROCEDURE — 11042 DBRDMT SUBQ TIS 1ST 20SQCM/<: CPT | Performed by: PODIATRIST

## 2023-12-21 RX ORDER — INDOMETHACIN 25 MG/1
25 CAPSULE ORAL AS NEEDED
COMMUNITY

## 2023-12-29 NOTE — PROGRESS NOTES
[address] [facility] FOOT SURGERY         Patient Name: Ida Crowley    : 1970    Visit Date: 2023    Office Visit Note      Subjective:         Patient is a 48 y.o. male who is being seen in office follow up visit for ulcer to the right foot. Patient states that he has been doing his own local wound care at this time. No new complaint at this time  Past Medical History:   Diagnosis Date    History of vascular access device 10/17/2023    single lumen PICC     No past surgical history on file. No family history on file. Social History     Tobacco Use    Smoking status: Never    Smokeless tobacco: Never   Substance Use Topics    Alcohol use: Not on file     No Known Allergies  Prior to Admission medications    Medication Sig Start Date End Date Taking? Authorizing Provider   indomethacin (INDOCIN) 25 MG capsule Take 1 capsule by mouth as needed for Pain   Yes Laine Elaine MD   ibuprofen (ADVIL;MOTRIN) 600 MG tablet Take 1 tablet by mouth every 6 hours as needed for Pain Can get this OTC 10/17/23  Yes Jinny Ramirez MD   Testosterone 1.62 % GEL Place 1 Pump onto the skin daily Max Daily Amount: 1 Pump   Yes ProviderLaine MD   buPROPion (WELLBUTRIN XL) 150 MG extended release tablet Take 1 tablet by mouth every morning   Yes Laine Elaine MD   famotidine (PEPCID) 20 MG tablet Take 1 tablet by mouth 2 times daily for 15 days OK to obtain this OTC 10/17/23 11/1/23  Jinny Ramirez MD     Review of Systems   Constitutional:  Negative for activity change, appetite change, chills, fatigue and fever. HENT:  Negative for ear pain. Respiratory:  Negative for shortness of breath. Cardiovascular:  Negative for leg swelling. Gastrointestinal:  Negative for abdominal pain, diarrhea and vomiting. Musculoskeletal:  Positive for arthralgias and joint swelling. Neurological:  Negative for weakness. Psychiatric/Behavioral:  Negative for behavioral problems.  The

## 2024-01-25 ENCOUNTER — OFFICE VISIT (OUTPATIENT)
Age: 54
End: 2024-01-25
Payer: COMMERCIAL

## 2024-01-25 VITALS
DIASTOLIC BLOOD PRESSURE: 93 MMHG | TEMPERATURE: 98.7 F | WEIGHT: 221 LBS | HEIGHT: 74 IN | SYSTOLIC BLOOD PRESSURE: 142 MMHG | HEART RATE: 100 BPM | BODY MASS INDEX: 28.36 KG/M2 | OXYGEN SATURATION: 99 %

## 2024-01-25 DIAGNOSIS — L97.512 ULCER OF RIGHT FOOT WITH FAT LAYER EXPOSED (HCC): Primary | ICD-10-CM

## 2024-01-25 PROCEDURE — 11042 DBRDMT SUBQ TIS 1ST 20SQCM/<: CPT | Performed by: PODIATRIST

## 2024-01-25 NOTE — PROGRESS NOTES
Chief Complaint   Patient presents with    Wound Check    Follow-up     No changes       1. Have you been to the ER, urgent care clinic since your last visit?  Hospitalized since your last visit?No    2. Have you seen or consulted any other health care providers outside of the [facility] since your last visit?  Include any pap smears or colon screening. No      BP (!) 142/93 (Site: Left Upper Arm, Position: Sitting, Cuff Size: Medium Adult)   Pulse 100   Temp 98.7 °F (37.1 °C) (Oral)   Ht 1.88 m (6' 2\")   Wt 100.2 kg (221 lb)   SpO2 99%   BMI 28.37 kg/m²

## 2024-02-06 NOTE — PROGRESS NOTES
[facility]         Patient Name: Alexis Tavarez    : 1970    Visit Date: 2024    Office Visit Note      Subjective:       Patient is a 53 y.o. male who is being seen in office follow up visit for ulcer to the right foot.  Patient states that he has been doing his own local wound care at this time.  No new complaint at this time.    Past Medical History:   Diagnosis Date    History of vascular access device 10/17/2023    single lumen PICC     No past surgical history on file.    No family history on file.   Social History     Tobacco Use    Smoking status: Never    Smokeless tobacco: Never   Substance Use Topics    Alcohol use: Not on file     No Known Allergies  Prior to Admission medications    Medication Sig Start Date End Date Taking? Authorizing Provider   indomethacin (INDOCIN) 25 MG capsule Take 1 capsule by mouth as needed for Pain   Yes Laine Elaine MD   ibuprofen (ADVIL;MOTRIN) 600 MG tablet Take 1 tablet by mouth every 6 hours as needed for Pain Can get this OTC 10/17/23  Yes Jero Quinones MD   Testosterone 1.62 % GEL Place 1 Pump onto the skin daily Max Daily Amount: 1 Pump   Yes Laine Elaine MD   buPROPion (WELLBUTRIN XL) 150 MG extended release tablet Take 1 tablet by mouth every morning   Yes Laine Elaine MD   famotidine (PEPCID) 20 MG tablet Take 1 tablet by mouth 2 times daily for 15 days OK to obtain this OTC 10/17/23 11/1/23  Jero Quinones MD     Review of Systems   Constitutional:  Negative for activity change, appetite change, chills, fatigue and fever.   HENT:  Negative for ear pain.    Respiratory:  Negative for shortness of breath.    Cardiovascular:  Negative for leg swelling.   Gastrointestinal:  Negative for abdominal pain, diarrhea and vomiting.   Musculoskeletal:  Positive for arthralgias and joint swelling.   Neurological:  Negative for weakness.   Psychiatric/Behavioral:  Negative for behavioral problems. The

## 2024-05-09 ENCOUNTER — OFFICE VISIT (OUTPATIENT)
Age: 54
End: 2024-05-09
Payer: COMMERCIAL

## 2024-05-09 VITALS
SYSTOLIC BLOOD PRESSURE: 131 MMHG | HEART RATE: 101 BPM | WEIGHT: 218.6 LBS | OXYGEN SATURATION: 98 % | BODY MASS INDEX: 28.06 KG/M2 | RESPIRATION RATE: 18 BRPM | DIASTOLIC BLOOD PRESSURE: 89 MMHG | HEIGHT: 74 IN | TEMPERATURE: 97.9 F

## 2024-05-09 DIAGNOSIS — M1A.9XX0 CHRONIC GOUT INVOLVING TOE OF RIGHT FOOT WITHOUT TOPHUS, UNSPECIFIED CAUSE: ICD-10-CM

## 2024-05-09 DIAGNOSIS — B35.1 ONYCHOMYCOSIS: Primary | ICD-10-CM

## 2024-05-09 PROCEDURE — 99214 OFFICE O/P EST MOD 30 MIN: CPT | Performed by: PODIATRIST

## 2024-05-09 RX ORDER — CLOTRIMAZOLE 1 %
CREAM (GRAM) TOPICAL
Qty: 45 G | Refills: 1 | Status: SHIPPED | OUTPATIENT
Start: 2024-05-09 | End: 2024-05-16

## 2024-05-09 ASSESSMENT — PATIENT HEALTH QUESTIONNAIRE - PHQ9
3. TROUBLE FALLING OR STAYING ASLEEP: NOT AT ALL
8. MOVING OR SPEAKING SO SLOWLY THAT OTHER PEOPLE COULD HAVE NOTICED. OR THE OPPOSITE, BEING SO FIGETY OR RESTLESS THAT YOU HAVE BEEN MOVING AROUND A LOT MORE THAN USUAL: NOT AT ALL
2. FEELING DOWN, DEPRESSED OR HOPELESS: NOT AT ALL
SUM OF ALL RESPONSES TO PHQ QUESTIONS 1-9: 0
1. LITTLE INTEREST OR PLEASURE IN DOING THINGS: NOT AT ALL
7. TROUBLE CONCENTRATING ON THINGS, SUCH AS READING THE NEWSPAPER OR WATCHING TELEVISION: NOT AT ALL
4. FEELING TIRED OR HAVING LITTLE ENERGY: NOT AT ALL
10. IF YOU CHECKED OFF ANY PROBLEMS, HOW DIFFICULT HAVE THESE PROBLEMS MADE IT FOR YOU TO DO YOUR WORK, TAKE CARE OF THINGS AT HOME, OR GET ALONG WITH OTHER PEOPLE: NOT DIFFICULT AT ALL
SUM OF ALL RESPONSES TO PHQ QUESTIONS 1-9: 0
6. FEELING BAD ABOUT YOURSELF - OR THAT YOU ARE A FAILURE OR HAVE LET YOURSELF OR YOUR FAMILY DOWN: NOT AT ALL
SUM OF ALL RESPONSES TO PHQ QUESTIONS 1-9: 0
9. THOUGHTS THAT YOU WOULD BE BETTER OFF DEAD, OR OF HURTING YOURSELF: NOT AT ALL
SUM OF ALL RESPONSES TO PHQ QUESTIONS 1-9: 0
SUM OF ALL RESPONSES TO PHQ9 QUESTIONS 1 & 2: 0
5. POOR APPETITE OR OVEREATING: NOT AT ALL

## 2024-05-09 NOTE — PROGRESS NOTES
Identified pt with two pt identifiers (name and ). Reviewed chart in preparation for visit and have obtained necessary documentation.    Alexis Tavarez is a 54 y.o. male  Chief Complaint   Patient presents with    3 Month Follow-Up     /89 (Site: Left Upper Arm, Position: Sitting, Cuff Size: Large Adult)   Pulse (!) 101   Temp 97.9 °F (36.6 °C) (Oral)   Resp 18   Ht 1.88 m (6' 2\")   Wt 99.2 kg (218 lb 9.6 oz)   SpO2 98%   BMI 28.07 kg/m²     1. Have you been to the ER, urgent care clinic since your last visit?  Hospitalized since your last visit?no    2. Have you seen or consulted any other health care providers outside of the [facility] since your last visit?  Include any pap smears or colon screening. no

## 2025-08-15 ENCOUNTER — HOSPITAL ENCOUNTER (EMERGENCY)
Facility: HOSPITAL | Age: 55
Discharge: HOME | End: 2025-08-15
Attending: EMERGENCY MEDICINE
Payer: COMMERCIAL

## 2025-08-15 ENCOUNTER — APPOINTMENT (OUTPATIENT)
Facility: HOSPITAL | Age: 55
End: 2025-08-15
Payer: COMMERCIAL

## 2025-08-15 VITALS
HEIGHT: 74 IN | SYSTOLIC BLOOD PRESSURE: 134 MMHG | HEART RATE: 98 BPM | DIASTOLIC BLOOD PRESSURE: 90 MMHG | RESPIRATION RATE: 16 BRPM | TEMPERATURE: 98.1 F | BODY MASS INDEX: 27.98 KG/M2 | WEIGHT: 218 LBS | OXYGEN SATURATION: 100 %

## 2025-08-15 DIAGNOSIS — K80.10 CALCULUS OF GALLBLADDER WITH CHOLECYSTITIS WITHOUT BILIARY OBSTRUCTION, UNSPECIFIED CHOLECYSTITIS ACUITY: ICD-10-CM

## 2025-08-15 DIAGNOSIS — K57.90 DIVERTICULOSIS: ICD-10-CM

## 2025-08-15 DIAGNOSIS — K70.31 ALCOHOLIC CIRRHOSIS OF LIVER WITH ASCITES (HCC): Primary | ICD-10-CM

## 2025-08-15 LAB
ALBUMIN SERPL-MCNC: 3.1 G/DL (ref 3.5–5.2)
ALBUMIN/GLOB SERPL: 0.7 (ref 1.1–2.2)
ALP SERPL-CCNC: 92 U/L (ref 40–129)
ALT SERPL-CCNC: 45 U/L (ref 10–50)
ANION GAP SERPL CALC-SCNC: 12 MMOL/L (ref 2–14)
AST SERPL-CCNC: 92 U/L (ref 10–50)
BASOPHILS # BLD: 0.06 K/UL (ref 0–0.1)
BASOPHILS NFR BLD: 0.8 % (ref 0–1)
BILIRUB SERPL-MCNC: 2.4 MG/DL (ref 0–1.2)
BUN SERPL-MCNC: 19 MG/DL (ref 6–20)
BUN/CREAT SERPL: 19 (ref 12–20)
CALCIUM SERPL-MCNC: 9.1 MG/DL (ref 8.6–10)
CHLORIDE SERPL-SCNC: 100 MMOL/L (ref 98–107)
CO2 SERPL-SCNC: 24 MMOL/L (ref 20–29)
CREAT SERPL-MCNC: 1 MG/DL (ref 0.7–1.2)
DIFFERENTIAL METHOD BLD: ABNORMAL
EOSINOPHIL # BLD: 0.02 K/UL (ref 0–0.4)
EOSINOPHIL NFR BLD: 0.3 % (ref 0–7)
ERYTHROCYTE [DISTWIDTH] IN BLOOD BY AUTOMATED COUNT: 17.3 % (ref 11.5–14.5)
GLOBULIN SER CALC-MCNC: 4.8 G/DL (ref 2–4)
GLUCOSE SERPL-MCNC: 117 MG/DL (ref 65–100)
HCT VFR BLD AUTO: 29.8 % (ref 36.6–50.3)
HGB BLD-MCNC: 9.7 G/DL (ref 12.1–17)
IMM GRANULOCYTES # BLD AUTO: 0.05 K/UL (ref 0–0.04)
IMM GRANULOCYTES NFR BLD AUTO: 0.7 % (ref 0–0.5)
LIPASE SERPL-CCNC: 32 U/L (ref 13–60)
LYMPHOCYTES # BLD: 0.86 K/UL (ref 0.8–3.5)
LYMPHOCYTES NFR BLD: 11.4 % (ref 12–49)
MCH RBC QN AUTO: 30.5 PG (ref 26–34)
MCHC RBC AUTO-ENTMCNC: 32.6 G/DL (ref 30–36.5)
MCV RBC AUTO: 93.7 FL (ref 80–99)
MONOCYTES # BLD: 0.68 K/UL (ref 0–1)
MONOCYTES NFR BLD: 9 % (ref 5–13)
NEUTS SEG # BLD: 5.84 K/UL (ref 1.8–8)
NEUTS SEG NFR BLD: 77.8 % (ref 32–75)
NRBC # BLD: 0 K/UL (ref 0–0.01)
NRBC BLD-RTO: 0 PER 100 WBC
PLATELET # BLD AUTO: 98 K/UL (ref 150–400)
PMV BLD AUTO: 9.7 FL (ref 8.9–12.9)
POTASSIUM SERPL-SCNC: 4.4 MMOL/L (ref 3.5–5.1)
PROT SERPL-MCNC: 7.9 G/DL (ref 6.4–8.3)
RBC # BLD AUTO: 3.18 M/UL (ref 4.1–5.7)
RBC MORPH BLD: ABNORMAL
SODIUM SERPL-SCNC: 136 MMOL/L (ref 136–145)
TROPONIN T SERPL HS-MCNC: 14.2 NG/L (ref 0–22)
WBC # BLD AUTO: 7.5 K/UL (ref 4.1–11.1)

## 2025-08-15 PROCEDURE — 96375 TX/PRO/DX INJ NEW DRUG ADDON: CPT

## 2025-08-15 PROCEDURE — 83690 ASSAY OF LIPASE: CPT

## 2025-08-15 PROCEDURE — 96374 THER/PROPH/DIAG INJ IV PUSH: CPT

## 2025-08-15 PROCEDURE — 6360000004 HC RX CONTRAST MEDICATION

## 2025-08-15 PROCEDURE — 85025 COMPLETE CBC W/AUTO DIFF WBC: CPT

## 2025-08-15 PROCEDURE — 99285 EMERGENCY DEPT VISIT HI MDM: CPT

## 2025-08-15 PROCEDURE — 74174 CTA ABD&PLVS W/CONTRAST: CPT

## 2025-08-15 PROCEDURE — 80053 COMPREHEN METABOLIC PANEL: CPT

## 2025-08-15 PROCEDURE — 76705 ECHO EXAM OF ABDOMEN: CPT

## 2025-08-15 PROCEDURE — 36415 COLL VENOUS BLD VENIPUNCTURE: CPT

## 2025-08-15 PROCEDURE — 84484 ASSAY OF TROPONIN QUANT: CPT

## 2025-08-15 PROCEDURE — 2500000003 HC RX 250 WO HCPCS

## 2025-08-15 PROCEDURE — 6360000002 HC RX W HCPCS

## 2025-08-15 RX ORDER — IOPAMIDOL 755 MG/ML
100 INJECTION, SOLUTION INTRAVASCULAR
Status: COMPLETED | OUTPATIENT
Start: 2025-08-15 | End: 2025-08-15

## 2025-08-15 RX ORDER — OMEPRAZOLE 20 MG/1
20 CAPSULE, DELAYED RELEASE ORAL
Qty: 180 CAPSULE | Refills: 1 | Status: SHIPPED | OUTPATIENT
Start: 2025-08-15

## 2025-08-15 RX ORDER — ONDANSETRON 4 MG/1
4 TABLET, ORALLY DISINTEGRATING ORAL 3 TIMES DAILY PRN
Qty: 21 TABLET | Refills: 0 | Status: SHIPPED | OUTPATIENT
Start: 2025-08-15

## 2025-08-15 RX ORDER — ONDANSETRON 2 MG/ML
4 INJECTION INTRAMUSCULAR; INTRAVENOUS ONCE
Status: COMPLETED | OUTPATIENT
Start: 2025-08-15 | End: 2025-08-15

## 2025-08-15 RX ADMIN — ONDANSETRON 4 MG: 2 INJECTION, SOLUTION INTRAMUSCULAR; INTRAVENOUS at 19:14

## 2025-08-15 RX ADMIN — FAMOTIDINE 20 MG: 10 INJECTION, SOLUTION INTRAVENOUS at 19:14

## 2025-08-15 RX ADMIN — IOPAMIDOL 100 ML: 755 INJECTION, SOLUTION INTRAVENOUS at 17:49

## 2025-08-15 ASSESSMENT — PAIN SCALES - GENERAL: PAINLEVEL_OUTOF10: 7

## 2025-08-15 ASSESSMENT — PAIN DESCRIPTION - ORIENTATION: ORIENTATION: RIGHT

## 2025-08-15 ASSESSMENT — PAIN - FUNCTIONAL ASSESSMENT
PAIN_FUNCTIONAL_ASSESSMENT: 0-10
PAIN_FUNCTIONAL_ASSESSMENT: ACTIVITIES ARE NOT PREVENTED

## 2025-08-15 ASSESSMENT — PAIN DESCRIPTION - DESCRIPTORS: DESCRIPTORS: ACHING

## 2025-08-15 ASSESSMENT — PAIN DESCRIPTION - PAIN TYPE: TYPE: ACUTE PAIN

## 2025-08-15 ASSESSMENT — PAIN DESCRIPTION - LOCATION: LOCATION: ABDOMEN
